# Patient Record
Sex: MALE | Race: WHITE | NOT HISPANIC OR LATINO | Employment: UNEMPLOYED | URBAN - METROPOLITAN AREA
[De-identification: names, ages, dates, MRNs, and addresses within clinical notes are randomized per-mention and may not be internally consistent; named-entity substitution may affect disease eponyms.]

---

## 2022-09-03 ENCOUNTER — HOSPITAL ENCOUNTER (EMERGENCY)
Facility: HOSPITAL | Age: 62
Discharge: HOME | End: 2022-09-04
Attending: EMERGENCY MEDICINE

## 2022-09-03 DIAGNOSIS — R45.850 HOMICIDAL IDEATION: ICD-10-CM

## 2022-09-03 DIAGNOSIS — R45.851 SUICIDAL IDEATION: Primary | ICD-10-CM

## 2022-09-03 DIAGNOSIS — R44.3 HALLUCINATIONS: ICD-10-CM

## 2022-09-03 LAB
ALBUMIN SERPL BCP-MCNC: 3.1 G/DL (ref 3.5–5.2)
ALP SERPL-CCNC: 110 U/L (ref 55–135)
ALT SERPL W/O P-5'-P-CCNC: 11 U/L (ref 10–44)
AMPHET+METHAMPHET UR QL: NEGATIVE
ANION GAP SERPL CALC-SCNC: 8 MMOL/L (ref 8–16)
APAP SERPL-MCNC: <3 UG/ML (ref 10–20)
AST SERPL-CCNC: 14 U/L (ref 10–40)
BARBITURATES UR QL SCN>200 NG/ML: NEGATIVE
BASOPHILS # BLD AUTO: 0.03 K/UL (ref 0–0.2)
BASOPHILS NFR BLD: 0.5 % (ref 0–1.9)
BENZODIAZ UR QL SCN>200 NG/ML: NEGATIVE
BILIRUB SERPL-MCNC: 0.3 MG/DL (ref 0.1–1)
BILIRUB UR QL STRIP: NEGATIVE
BUN SERPL-MCNC: 4 MG/DL (ref 8–23)
BZE UR QL SCN: NEGATIVE
CALCIUM SERPL-MCNC: 8.8 MG/DL (ref 8.7–10.5)
CANNABINOIDS UR QL SCN: ABNORMAL
CHLORIDE SERPL-SCNC: 103 MMOL/L (ref 95–110)
CLARITY UR REFRACT.AUTO: CLEAR
CO2 SERPL-SCNC: 22 MMOL/L (ref 23–29)
COLOR UR AUTO: YELLOW
CREAT SERPL-MCNC: 0.7 MG/DL (ref 0.5–1.4)
CREAT UR-MCNC: 71 MG/DL (ref 23–375)
DIFFERENTIAL METHOD: ABNORMAL
EOSINOPHIL # BLD AUTO: 0.1 K/UL (ref 0–0.5)
EOSINOPHIL NFR BLD: 2.5 % (ref 0–8)
ERYTHROCYTE [DISTWIDTH] IN BLOOD BY AUTOMATED COUNT: 11.5 % (ref 11.5–14.5)
EST. GFR  (NO RACE VARIABLE): >60 ML/MIN/1.73 M^2
ETHANOL SERPL-MCNC: 110 MG/DL
ETHANOL SERPL-MCNC: 214 MG/DL
ETHANOL SERPL-MCNC: 76 MG/DL
GLUCOSE SERPL-MCNC: 83 MG/DL (ref 70–110)
GLUCOSE UR QL STRIP: NEGATIVE
HCT VFR BLD AUTO: 31.2 % (ref 40–54)
HGB BLD-MCNC: 11.4 G/DL (ref 14–18)
HGB UR QL STRIP: NEGATIVE
IMM GRANULOCYTES # BLD AUTO: 0.01 K/UL (ref 0–0.04)
IMM GRANULOCYTES NFR BLD AUTO: 0.2 % (ref 0–0.5)
KETONES UR QL STRIP: NEGATIVE
LEUKOCYTE ESTERASE UR QL STRIP: NEGATIVE
LYMPHOCYTES # BLD AUTO: 1.4 K/UL (ref 1–4.8)
LYMPHOCYTES NFR BLD: 25 % (ref 18–48)
MCH RBC QN AUTO: 33.5 PG (ref 27–31)
MCHC RBC AUTO-ENTMCNC: 36.5 G/DL (ref 32–36)
MCV RBC AUTO: 92 FL (ref 82–98)
METHADONE UR QL SCN>300 NG/ML: NEGATIVE
MONOCYTES # BLD AUTO: 0.5 K/UL (ref 0.3–1)
MONOCYTES NFR BLD: 9.6 % (ref 4–15)
NEUTROPHILS # BLD AUTO: 3.5 K/UL (ref 1.8–7.7)
NEUTROPHILS NFR BLD: 62.2 % (ref 38–73)
NITRITE UR QL STRIP: NEGATIVE
NRBC BLD-RTO: 0 /100 WBC
OPIATES UR QL SCN: NEGATIVE
PCP UR QL SCN>25 NG/ML: NEGATIVE
PH UR STRIP: 7 [PH] (ref 5–8)
PLATELET # BLD AUTO: 222 K/UL (ref 150–450)
PMV BLD AUTO: 8.5 FL (ref 9.2–12.9)
POTASSIUM SERPL-SCNC: 3.5 MMOL/L (ref 3.5–5.1)
PROT SERPL-MCNC: 6.8 G/DL (ref 6–8.4)
PROT UR QL STRIP: NEGATIVE
RBC # BLD AUTO: 3.4 M/UL (ref 4.6–6.2)
SARS-COV-2 RDRP RESP QL NAA+PROBE: NEGATIVE
SODIUM SERPL-SCNC: 133 MMOL/L (ref 136–145)
SP GR UR STRIP: 1.01 (ref 1–1.03)
TOXICOLOGY INFORMATION: ABNORMAL
TSH SERPL DL<=0.005 MIU/L-ACNC: 2.47 UIU/ML (ref 0.4–4)
URN SPEC COLLECT METH UR: NORMAL
WBC # BLD AUTO: 5.65 K/UL (ref 3.9–12.7)

## 2022-09-03 PROCEDURE — 99284 PR EMERGENCY DEPT VISIT,LEVEL IV: ICD-10-PCS | Mod: CS,,, | Performed by: EMERGENCY MEDICINE

## 2022-09-03 PROCEDURE — U0002 COVID-19 LAB TEST NON-CDC: HCPCS

## 2022-09-03 PROCEDURE — 82077 ASSAY SPEC XCP UR&BREATH IA: CPT

## 2022-09-03 PROCEDURE — 99284 EMERGENCY DEPT VISIT MOD MDM: CPT | Mod: CS,,, | Performed by: EMERGENCY MEDICINE

## 2022-09-03 PROCEDURE — 82077 ASSAY SPEC XCP UR&BREATH IA: CPT | Mod: 91

## 2022-09-03 PROCEDURE — 84443 ASSAY THYROID STIM HORMONE: CPT

## 2022-09-03 PROCEDURE — 80143 DRUG ASSAY ACETAMINOPHEN: CPT

## 2022-09-03 PROCEDURE — 80307 DRUG TEST PRSMV CHEM ANLYZR: CPT

## 2022-09-03 PROCEDURE — 99285 EMERGENCY DEPT VISIT HI MDM: CPT

## 2022-09-03 PROCEDURE — 81003 URINALYSIS AUTO W/O SCOPE: CPT | Mod: 59

## 2022-09-03 PROCEDURE — 80053 COMPREHEN METABOLIC PANEL: CPT

## 2022-09-03 PROCEDURE — 85025 COMPLETE CBC W/AUTO DIFF WBC: CPT

## 2022-09-03 NOTE — PROVIDER PROGRESS NOTES - EMERGENCY DEPT.
Encounter Date: 9/3/2022    ED Physician Progress Notes        Physician Note:   --------------------            09/03/22               1614     --------------------   BP:       123/63     Pulse:      67       Resp:       16       Temp:               --------------------    Pt signed out from Dr Goncalves, pending UDS and UA.  I saw and examined the patient.  Results returned and patient was considered medically cleared for transfer to psychiatric facility.

## 2022-09-03 NOTE — ED NOTES
Patient identifiers verified and correct for Bjorn Handy  LOC: The patient is awake, alert and aware of environment the patient is oriented x 3 and speaking appropriately.   APPEARANCE: Patient appears comfortable and in no acute distress, patient is unkept.  SKIN: The skin is warm and dry, color consistent with ethnicity, patient has normal skin turgor and moist mucus membranes, skin intact, no breakdown or bruising noted.   MUSCULOSKELETAL: Patient moving all extremities spontaneously, no swelling noted.  RESPIRATORY: Airway is open and patent, respirations are spontaneous, patient has a normal effort and rate, no accessory muscle use noted, pt placed on continuous pulse ox with O2 sats noted at 99% on room air.  CARDIAC: Patient has a normal rate, no edema noted, capillary refill < 3 seconds.   GASTRO: Soft and non tender to palpation, no distention noted, normoactive bowel sounds present in all four quadrants. Pt states bowel movements have been regular.  : Pt denies any pain or frequency with urination.  NEURO: Pt opens eyes spontaneously, behavior appropriate to situation, follows commands, facial expression symmetrical, bilateral hand grasp equal and even, purposeful motor response noted, normal sensation in all extremities when touched with a finger.

## 2022-09-03 NOTE — ED PROVIDER NOTES
"Encounter Date: 9/3/2022       History     Chief Complaint   Patient presents with    Depression     Hx of depression and bipolar; states meds are not working.     Suicidal     62-year-old male with a past medical history of depression and bipolar disorder presents the ED complaining of his medications not working.  Patient states that he is from [place] and he presents the ED today because he has had new onset suicidal ideation as of today.  He reports a suicidal plan - "I would buy a gun and shoot myself".  He also reports hospital ideation and plan of homicide of his previous roommate for "cheating him".  He reports both auditory and visual hallucinations.  Patient denies all other complaints at this time.  No other complaints this time.    The history is provided by the patient.   Review of patient's allergies indicates:  No Known Allergies  No past medical history on file.  No past surgical history on file.  No family history on file.     Review of Systems   Constitutional:  Negative for activity change, chills and fever.   HENT:  Negative for congestion, ear pain and sore throat.    Respiratory:  Negative for shortness of breath and stridor.    Cardiovascular:  Negative for chest pain and palpitations.   Gastrointestinal:  Negative for abdominal pain, nausea and vomiting.   Genitourinary:  Negative for dysuria and urgency.   Musculoskeletal:  Negative for back pain.   Skin:  Negative for rash.   Allergic/Immunologic: Negative for environmental allergies, food allergies and immunocompromised state.   Neurological:  Negative for dizziness, syncope, weakness and headaches.   Hematological:  Does not bruise/bleed easily.   Psychiatric/Behavioral:  Positive for suicidal ideas.         Suicidal ideas, homicidal ideas, visual and auditory hallucinations.     Physical Exam     Initial Vitals [09/03/22 1203]   BP Pulse Resp Temp SpO2   (!) 162/74 84 18 98.2 °F (36.8 °C) 99 %      MAP       --         Physical " Exam    Nursing note and vitals reviewed.  Constitutional: Vital signs are normal. He appears well-developed and well-nourished. He is not diaphoretic. He appears distressed.   Appears to be in mild-to-moderate distress due to symptoms.   HENT:   Head: Normocephalic and atraumatic.   Right Ear: External ear normal.   Left Ear: External ear normal.   Eyes: EOM are normal. Right eye exhibits no discharge. Left eye exhibits no discharge.   Neck: Trachea normal. Neck supple. No thyroid mass present.   Cardiovascular:  Normal rate, regular rhythm, normal heart sounds and intact distal pulses.     Exam reveals no gallop and no friction rub.       No murmur heard.  Pulmonary/Chest: Breath sounds normal. No respiratory distress. He has no wheezes. He has no rhonchi. He has no rales.   Abdominal: Abdomen is soft. Bowel sounds are normal. He exhibits no distension. There is no abdominal tenderness. There is no rebound and no guarding.   Musculoskeletal:      Cervical back: Neck supple.     Neurological: He is alert and oriented to person, place, and time. He has normal strength. No cranial nerve deficit or sensory deficit. GCS score is 15. GCS eye subscore is 4. GCS verbal subscore is 5. GCS motor subscore is 6.   Skin: Skin is warm and dry. Capillary refill takes less than 2 seconds. No rash noted.   Psychiatric: He exhibits a depressed mood. He expresses homicidal and suicidal ideation. He expresses suicidal plans and homicidal plans.       ED Course   Procedures  Labs Reviewed   CBC W/ AUTO DIFFERENTIAL - Abnormal; Notable for the following components:       Result Value    RBC 3.40 (*)     Hemoglobin 11.4 (*)     Hematocrit 31.2 (*)     MCH 33.5 (*)     MCHC 36.5 (*)     MPV 8.5 (*)     All other components within normal limits   COMPREHENSIVE METABOLIC PANEL - Abnormal; Notable for the following components:    Sodium 133 (*)     CO2 22 (*)     BUN 4 (*)     Albumin 3.1 (*)     All other components within normal limits    ALCOHOL,MEDICAL (ETHANOL) - Abnormal; Notable for the following components:    Alcohol, Serum 214 (*)     All other components within normal limits   ACETAMINOPHEN LEVEL - Abnormal; Notable for the following components:    Acetaminophen (Tylenol), Serum <3.0 (*)     All other components within normal limits   TSH   URINALYSIS, REFLEX TO URINE CULTURE    Narrative:     Specimen Source->Urine   SARS-COV-2 RNA AMPLIFICATION, QUAL   DRUG SCREEN PANEL, URINE EMERGENCY          Imaging Results    None          Medications - No data to display  Medical Decision Making:   Initial Assessment:   62-year-old male who appears to be in mild to moderate distress due to symptoms presents to the ED reporting SI, HI and visual/auditory hallucinations.  ABC's intact.  Physical exam is grossly unremarkable.  Differential Diagnosis:   MDD  Bipolar disorder  Schizophrenia  Electrolyte abnormality  Medication noncompliance    ED Management:  CBC shows a mild anemia which is in no need of emergent intervention.  CMP, TSH, COVID screening, acetaminophen level are all unremarkable.  Ethanol is elevated at 214.  Urinalysis is unremarkable.  Patient is active pending drug screen panel, I will sign out the patient to the oncoming provider.           Attending Attestation:   Physician Attestation Statement for Resident:  As the supervising MD   Physician Attestation Statement: I have personally seen and examined this patient.   I agree with the above history.  -:   As the supervising MD I agree with the above PE.     As the supervising MD I agree with the above treatment, course, plan, and disposition.                           Clinical Impression:   Final diagnoses:  [R45.851] Suicidal ideation (Primary)  [R45.850] Homicidal ideation  [R44.3] Hallucinations      ED Disposition Condition    Transfer to Psych Facility           ED Prescriptions    None       Follow-up Information    None          Lucas Goncalves MD  Resident  09/03/22  1531       Marko Woody MD  09/04/22 0702

## 2022-09-03 NOTE — ED NOTES
Pt belongings put in safe with list of items on bag(#[id number])   Pt belongings in closet: 1 pair of jeans, 1 pair of shoes, 1 pair of socks, 1 necklace, 1 cane, 1 pair of underwear, 1 hat, 1 belt, 1 watch

## 2022-09-04 ENCOUNTER — HOSPITAL ENCOUNTER (INPATIENT)
Facility: HOSPITAL | Age: 62
LOS: 9 days | Discharge: HOME | DRG: 885 | End: 2022-09-13
Attending: PSYCHIATRY & NEUROLOGY | Admitting: PSYCHIATRY & NEUROLOGY

## 2022-09-04 VITALS
DIASTOLIC BLOOD PRESSURE: 82 MMHG | SYSTOLIC BLOOD PRESSURE: 152 MMHG | HEART RATE: 92 BPM | OXYGEN SATURATION: 97 % | RESPIRATION RATE: 18 BRPM | TEMPERATURE: 98 F

## 2022-09-04 DIAGNOSIS — F32.9 MDD (MAJOR DEPRESSIVE DISORDER): ICD-10-CM

## 2022-09-04 PROCEDURE — 25000003 PHARM REV CODE 250: Performed by: PSYCHIATRY & NEUROLOGY

## 2022-09-04 PROCEDURE — 11400000 HC PSYCH PRIVATE ROOM

## 2022-09-04 RX ORDER — ADHESIVE BANDAGE
30 BANDAGE TOPICAL DAILY PRN
Status: DISCONTINUED | OUTPATIENT
Start: 2022-09-04 | End: 2022-09-13 | Stop reason: HOSPADM

## 2022-09-04 RX ORDER — HALOPERIDOL 5 MG/ML
10 INJECTION INTRAMUSCULAR EVERY 4 HOURS PRN
Status: DISCONTINUED | OUTPATIENT
Start: 2022-09-04 | End: 2022-09-13 | Stop reason: HOSPADM

## 2022-09-04 RX ORDER — MAG HYDROX/ALUMINUM HYD/SIMETH 200-200-20
30 SUSPENSION, ORAL (FINAL DOSE FORM) ORAL EVERY 6 HOURS PRN
Status: DISCONTINUED | OUTPATIENT
Start: 2022-09-04 | End: 2022-09-13 | Stop reason: HOSPADM

## 2022-09-04 RX ORDER — DIPHENHYDRAMINE HYDROCHLORIDE 50 MG/ML
50 INJECTION INTRAMUSCULAR; INTRAVENOUS EVERY 4 HOURS PRN
Status: DISCONTINUED | OUTPATIENT
Start: 2022-09-04 | End: 2022-09-13 | Stop reason: HOSPADM

## 2022-09-04 RX ORDER — MAG HYDROX/ALUMINUM HYD/SIMETH 200-200-20
30 SUSPENSION, ORAL (FINAL DOSE FORM) ORAL
Status: DISCONTINUED | OUTPATIENT
Start: 2022-09-04 | End: 2022-09-13 | Stop reason: HOSPADM

## 2022-09-04 RX ORDER — LEVOTHYROXINE SODIUM 50 UG/1
50 TABLET ORAL
Status: DISCONTINUED | OUTPATIENT
Start: 2022-09-05 | End: 2022-09-13 | Stop reason: HOSPADM

## 2022-09-04 RX ORDER — GABAPENTIN 100 MG/1
100 CAPSULE ORAL 3 TIMES DAILY
Status: COMPLETED | OUTPATIENT
Start: 2022-09-08 | End: 2022-09-09

## 2022-09-04 RX ORDER — LORAZEPAM 2 MG/ML
2 INJECTION INTRAMUSCULAR EVERY 4 HOURS PRN
Status: DISCONTINUED | OUTPATIENT
Start: 2022-09-04 | End: 2022-09-13 | Stop reason: HOSPADM

## 2022-09-04 RX ORDER — FOLIC ACID 1 MG/1
1 TABLET ORAL DAILY
Status: DISCONTINUED | OUTPATIENT
Start: 2022-09-04 | End: 2022-09-13 | Stop reason: HOSPADM

## 2022-09-04 RX ORDER — SERTRALINE HYDROCHLORIDE 50 MG/1
50 TABLET, FILM COATED ORAL DAILY
Status: DISCONTINUED | OUTPATIENT
Start: 2022-09-04 | End: 2022-09-13 | Stop reason: HOSPADM

## 2022-09-04 RX ORDER — DIPHENHYDRAMINE HCL 50 MG
50 CAPSULE ORAL EVERY 4 HOURS PRN
Status: DISCONTINUED | OUTPATIENT
Start: 2022-09-04 | End: 2022-09-13 | Stop reason: HOSPADM

## 2022-09-04 RX ORDER — GABAPENTIN 300 MG/1
300 CAPSULE ORAL 3 TIMES DAILY
Status: DISPENSED | OUTPATIENT
Start: 2022-09-04 | End: 2022-09-06

## 2022-09-04 RX ORDER — GABAPENTIN 100 MG/1
200 CAPSULE ORAL 3 TIMES DAILY
Status: DISPENSED | OUTPATIENT
Start: 2022-09-06 | End: 2022-09-08

## 2022-09-04 RX ORDER — TRAZODONE HYDROCHLORIDE 100 MG/1
100 TABLET ORAL NIGHTLY PRN
Status: DISCONTINUED | OUTPATIENT
Start: 2022-09-04 | End: 2022-09-06

## 2022-09-04 RX ORDER — THIAMINE HCL 100 MG
100 TABLET ORAL DAILY
Status: DISCONTINUED | OUTPATIENT
Start: 2022-09-04 | End: 2022-09-13 | Stop reason: HOSPADM

## 2022-09-04 RX ORDER — HYDROXYZINE HYDROCHLORIDE 50 MG/1
50 TABLET, FILM COATED ORAL EVERY 4 HOURS PRN
Status: DISCONTINUED | OUTPATIENT
Start: 2022-09-04 | End: 2022-09-13 | Stop reason: HOSPADM

## 2022-09-04 RX ORDER — ACETAMINOPHEN 325 MG/1
650 TABLET ORAL EVERY 6 HOURS PRN
Status: DISCONTINUED | OUTPATIENT
Start: 2022-09-04 | End: 2022-09-13 | Stop reason: HOSPADM

## 2022-09-04 RX ORDER — LORAZEPAM 1 MG/1
2 TABLET ORAL EVERY 4 HOURS PRN
Status: DISCONTINUED | OUTPATIENT
Start: 2022-09-04 | End: 2022-09-13 | Stop reason: HOSPADM

## 2022-09-04 RX ORDER — HALOPERIDOL 5 MG/1
10 TABLET ORAL EVERY 4 HOURS PRN
Status: DISCONTINUED | OUTPATIENT
Start: 2022-09-04 | End: 2022-09-13 | Stop reason: HOSPADM

## 2022-09-04 RX ADMIN — FOLIC ACID 1 MG: 1 TABLET ORAL at 03:09

## 2022-09-04 RX ADMIN — THIAMINE HCL TAB 100 MG 100 MG: 100 TAB at 03:09

## 2022-09-04 RX ADMIN — ACETAMINOPHEN 650 MG: 325 TABLET ORAL at 06:09

## 2022-09-04 RX ADMIN — SERTRALINE HYDROCHLORIDE 50 MG: 50 TABLET ORAL at 03:09

## 2022-09-04 RX ADMIN — TRAZODONE HYDROCHLORIDE 100 MG: 100 TABLET ORAL at 08:09

## 2022-09-04 RX ADMIN — GABAPENTIN 300 MG: 300 CAPSULE ORAL at 03:09

## 2022-09-04 RX ADMIN — GABAPENTIN 300 MG: 300 CAPSULE ORAL at 08:09

## 2022-09-04 RX ADMIN — MULTIPLE VITAMINS W/ MINERALS TAB 1 TABLET: TAB at 03:09

## 2022-09-04 NOTE — ED NOTES
Pt remains in paper scrubs, resting in stretcher comfortably. No signs of distress noted. Sitter remains at bedside in direct visual contact, charting per protocol every 15 minutes. No equipment or belongings are in the patients room. Pt aware of plan of care. Will continue to monitor.

## 2022-09-04 NOTE — ED NOTES
Pt care assumed. Report received by POLO Nickerson. Pt lying in stretcher in low and locked position and side rails raised x2.  Pt in NAD and verbalized no needs at this time.

## 2022-09-04 NOTE — ED NOTES
Pt resting calmly without distress noted. No new needs expressed at this time. RN/tech at bedside. Self repositioning noted. Visible rise and fall of chest. No apparent needs at this time. Will continue to monitor.

## 2022-09-04 NOTE — PLAN OF CARE
"Psychosocial Assessment     Pt is a 62 y.o. YO  male admitted due to Depression. Pt UDS was presumptuous for THC.  PT ETOH 214 in ED. Pt denies  SI, HI, and AVH. Pt admits to smoking marijuana and drinking alcohol prior to admission. Pt last inpatient  was over a month ago in [place]. Pt presents Cooperative with CM staff 1:1. Pt states that he went to the hospital in [place] to get his eyes checked, but was sent here. Pt states that he is homeless, he lost his housing when he went inpatient. Pt states that he is depressed since losing his mother and sisters. Pt reports having a brother and children, but do not talk to them.  Pt originally from [place]. Pt has  3 dependents. Pt  is Single .  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  Pt reports financial issues. Pt states that he only gets about $300 in SS. Pt disabled. Pt denies legal issues. Pt states they do not receive comfort from spiritual practices. Pt did not have a collateral contact. Pt discharge plan at this time is shelter or unknown at this time.       09/04/22 1400   Initial Information   Source of Information patient   Reason for Admission Depression   Patient Aware of Diagnosis yes   Limitations on Visitors/Phone Calls none   Temporary Family Living Arrangements (While Hospitalized) none needed   Arrived From emergency department   Current or Previous  Service active duty, past   Mutuality/Individual Preferences   Anxieties, Fears or Concerns "Life gives me anxiety."   Patient-Specific Goals (Include Timeframe) "Getting my eyes fixed, I am going blind."   Spiritual Beliefs   Spiritual, Cultural Beliefs, Spiritism Practices, Values that Affect Care yes   Description of Beliefs that Will Affect Care prodistin    Contact Status none needed   Hospital  Requested no   Coping/Stress/Tolerance   Major Change/Loss/Stressor/Fears death of a loved one   Relationship/Environment   Primary Source of " Support/Comfort no one   Lives With alone   Resource/Environmental Concerns   Current Living Arrangements homeless   Resource/Environmental Concerns environmental;financial   Substance Use/Withdrawal   Substance Use Current, used prior to admission   Have You Ever Experienced Withdrawal Symptoms? Patient denies   Additional Tobacco Use   How many cigarettes do you typically have per day? 10   Abuse Screen (yes response referral indicated)   Feels Unsafe at Home or Work/School no   Feels Threatened by Someone no   Does Anyone Try to Keep You From Having Contact with Others or Doing Things Outside Your Home? no   Physical Signs of Abuse Present no   Abuse Details   Physical Abuse No   Sexual Abuse No   Emotional Abuse No   Violence Risk   Feels Like Hurting Others no   Previous Attempt to Harm Others no   AUDIT-C (Alcohol Use Disorders ID Test)   Alcohol Use In Past Year 3-->two to three times per week   Alcohol Amount Per Day In Past Year 2-->five or six   More Than 6 Drinks On One Occasion 4-->daily or almost daily   Total AUDIT-C Score 9   Depression Screen (Over the Past Two Weeks)   Have You Felt Down, Depressed or Hopeless? yes   Have You Felt Little Interest or Pleasure in Doing Things? yes   Geriatric Depression Scale: Short Form   Are You Basically Satisfied with Your Life? 1-->no   Have You Dropped Many of Your Activities and Interests? 1-->yes   Do You Feel That Your Life Is Empty? 1-->yes   Do You Often Get Bored? 1-->yes   Are You in Good Spirits Most of the Time? 1-->no   Are You Afraid That Something Bad Is Going to Happen to You? 0-->no   Do You Feel Happy Most of the Time? 1-->no   Do You Often Feel Helpless? 1-->yes   Do You Prefer to Stay at Home, Rather Than Going Out and Doing New Things? 1-->yes   Do You Feel You Have More Problems with Memory Than Most? 1-->yes   Do You Think It Is Wonderful to be Alive Now? 1-->no   Do You Feel Pretty Worthless the Way You Are Now? 1-->yes   Do You Feel Full of  Energy? 1-->no   Do You Feel That Your Situation Is Hopeless? 1-->yes   Do You Feel That Most People Are Better Off Than You Are? 1-->yes   Score: Calculated (Geriatric Depression Scale) 14   Transition Planning   Patient/Family Anticipates Transition to shelter   Patient/Family Anticipated Services at Transition outpatient care   Transportation Anticipated health plan transportation

## 2022-09-04 NOTE — PSYCH EVALUATION
9/4/2022 11:00 AM   Bjorn Handy   1960   [id number]            Psychiatry Inpatient Admission Note    Date of Admission: 9/4/2022  8:00 AM    Current Legal Status: Physician's Emergency Certificate (PEC)    Chief Complaint: Depression with suicidal ideation    SUBJECTIVE:   History of Present Illness:   Bjorn Handy is a 62 y.o. male placed under a PEC at [facility] on [facility] due to reports that he felt like his depression medication was ineffective and he had been having suicidal ideation.  He reported a potential plan of buying a gun and shooting himself.  [facility] [place] [facility] [place] [facility] [place] because he needs eye surgery.  He was most recently in [place] 1 month ago.  Calm and cooperative during my exam.  He does not feel as though his current medication regimen has been sufficient for depression.  Admitted for medication management and safety monitoring.    UDS: presumptive cannabis  Blood alcohol: 214 in ED      Past Psychiatric History:   Previous Psychiatric Hospitalizations: Several prior inpatient hospitalizations   Previous Suicide Attempts: No actual attempts noted   [facility]    Past Medical/Surgical History:   Past Medical History:   Diagnosis Date    Addiction to drug     Alcohol abuse     Anxiety     Bipolar disorder     Depression     Hallucination     Headache     History of psychiatric hospitalization     Hx of psychiatric care     Hypertension     Liver disease     Neuropathy     Psychiatric exam requested by authority     Psychiatric problem     Suicide attempt     Withdrawal symptoms, alcohol      No past surgical history on file.      Family Psychiatric History:   None known     Allergies:   Review of patient's allergies indicates:   Allergen Reactions    Opioids - morphine analogues Anaphylaxis    Penicillins Anaphylaxis       Substance Abuse History:   Tobacco: 1/2 ppd  Alcohol: States  "that he rarely drinks  Illicit Substances: "Every now and then"  Treatment: Denies      Current Medications:   Home Psychiatric Meds: Trazodone, Depakote, Vistaril, Melatonin    Scheduled Meds:    aluminum-magnesium hydroxide-simethicone  30 mL Oral QID (AC & HS)      PRN Meds: acetaminophen, aluminum-magnesium hydroxide-simethicone, haloperidoL **AND** diphenhydrAMINE **AND** LORazepam, diphenhydrAMINE, haloperidol lactate, hydrOXYzine HCL, lorazepam, magnesium hydroxide 400 mg/5 ml, magnesium hydroxide 400 mg/5 ml, trazodone   Psychotherapeutics (From admission, onward)      Start     Stop Route Frequency Ordered    09/04/22 0800  haloperidol lactate injection 10 mg         -- IM Every 4 hours PRN 09/04/22 0800    09/04/22 0800  lorazepam injection 2 mg         -- IM Every 4 hours PRN 09/04/22 0800    09/04/22 0800  traZODone tablet 100 mg         -- Oral Nightly PRN 09/04/22 0800    09/04/22 0800  haloperidoL tablet 10 mg  (Med - Acute  Behavioral Management)        See Hyperspace for full Linked Orders Report.    -- Oral Every 4 hours PRN 09/04/22 0800    09/04/22 0800  LORazepam tablet 2 mg  (Med - Acute  Behavioral Management)        See Hyperspace for full Linked Orders Report.    -- Oral Every 4 hours PRN 09/04/22 0800              Social History:  Housing Status: Lives alone in a [facility] in [place]  Relationship Status/Sexual Orientation: Never .  No current relationship  Children: 3 children  Education: 8th grade education   Employment Status/Info: Unemployed    history: [employer]        Legal History:   Past Charges/Incarcerations: 6 year incarceration roughly 20 years ago   Pending charges: Denies      OBJECTIVE:   Medical Review Of Systems:  Constitutional: negative  Eyes: right eye blindness.  Left eye cataracts.  Respiratory: negative  Cardiovascular: negative  Gastrointestinal: negative  Genitourinary:negative  Musculoskeletal:negative  Neurological: negative    Vitals   Vitals:    " 09/04/22 0710   BP: (!) 152/87   Pulse: 109   Resp: 18   Temp: 97.9 °F (36.6 °C)        Labs/Imaging/Studies:   Recent Results (from the past 48 hour(s))   CBC auto differential    Collection Time: 09/03/22 12:43 PM   Result Value Ref Range    WBC 5.65 3.90 - 12.70 K/uL    RBC 3.40 (L) 4.60 - 6.20 M/uL    Hemoglobin 11.4 (L) 14.0 - 18.0 g/dL    Hematocrit 31.2 (L) 40.0 - 54.0 %    MCV 92 82 - 98 fL    MCH 33.5 (H) 27.0 - 31.0 pg    MCHC 36.5 (H) 32.0 - 36.0 g/dL    RDW 11.5 11.5 - 14.5 %    Platelets 222 150 - 450 K/uL    MPV 8.5 (L) 9.2 - 12.9 fL    Immature Granulocytes 0.2 0.0 - 0.5 %    Gran # (ANC) 3.5 1.8 - 7.7 K/uL    Immature Grans (Abs) 0.01 0.00 - 0.04 K/uL    Lymph # 1.4 1.0 - 4.8 K/uL    Mono # 0.5 0.3 - 1.0 K/uL    Eos # 0.1 0.0 - 0.5 K/uL    Baso # 0.03 0.00 - 0.20 K/uL    nRBC 0 0 /100 WBC    Gran % 62.2 38.0 - 73.0 %    Lymph % 25.0 18.0 - 48.0 %    Mono % 9.6 4.0 - 15.0 %    Eosinophil % 2.5 0.0 - 8.0 %    Basophil % 0.5 0.0 - 1.9 %    Differential Method Automated    Comprehensive metabolic panel    Collection Time: 09/03/22 12:43 PM   Result Value Ref Range    Sodium 133 (L) 136 - 145 mmol/L    Potassium 3.5 3.5 - 5.1 mmol/L    Chloride 103 95 - 110 mmol/L    CO2 22 (L) 23 - 29 mmol/L    Glucose 83 70 - 110 mg/dL    BUN 4 (L) 8 - 23 mg/dL    Creatinine 0.7 0.5 - 1.4 mg/dL    Calcium 8.8 8.7 - 10.5 mg/dL    Total Protein 6.8 6.0 - 8.4 g/dL    Albumin 3.1 (L) 3.5 - 5.2 g/dL    Total Bilirubin 0.3 0.1 - 1.0 mg/dL    Alkaline Phosphatase 110 55 - 135 U/L    AST 14 10 - 40 U/L    ALT 11 10 - 44 U/L    Anion Gap 8 8 - 16 mmol/L    eGFR >60.0 >60 mL/min/1.73 m^2   TSH    Collection Time: 09/03/22 12:43 PM   Result Value Ref Range    TSH 2.472 0.400 - 4.000 uIU/mL   Ethanol    Collection Time: 09/03/22 12:43 PM   Result Value Ref Range    Alcohol, Serum 214 (H) <10 mg/dL   Acetaminophen level    Collection Time: 09/03/22 12:43 PM   Result Value Ref Range    Acetaminophen (Tylenol), Serum <3.0 (L) 10.0 -  20.0 ug/mL   Urinalysis, Reflex to Urine Culture Urine, Clean Catch    Collection Time: 09/03/22 12:45 PM    Specimen: Urine   Result Value Ref Range    Specimen UA Urine, Clean Catch     Color, UA Yellow Yellow, Straw, Millicent    Appearance, UA Clear Clear    pH, UA 7.0 5.0 - 8.0    Specific Gravity, UA 1.010 1.005 - 1.030    Protein, UA Negative Negative    Glucose, UA Negative Negative    Ketones, UA Negative Negative    Bilirubin (UA) Negative Negative    Occult Blood UA Negative Negative    Nitrite, UA Negative Negative    Leukocytes, UA Negative Negative   Drug screen panel, emergency    Collection Time: 09/03/22 12:45 PM   Result Value Ref Range    Benzodiazepines Negative Negative    Methadone metabolites Negative Negative    Cocaine (Metab.) Negative Negative    Opiate Scrn, Ur Negative Negative    Barbiturate Screen, Ur Negative Negative    Amphetamine Screen, Ur Negative Negative    THC Presumptive Positive (A) Negative    Phencyclidine Negative Negative    Creatinine, Urine 71.0 23.0 - 375.0 mg/dL    Toxicology Information SEE COMMENT    COVID-19 Rapid Screening    Collection Time: 09/03/22 12:45 PM   Result Value Ref Range    SARS-CoV-2 RNA, Amplification, Qual Negative Negative   Ethanol    Collection Time: 09/03/22  6:41 PM   Result Value Ref Range    Alcohol, Serum 110 (H) <10 mg/dL   Ethanol    Collection Time: 09/03/22  8:07 PM   Result Value Ref Range    Alcohol, Serum 76 (H) <10 mg/dL      No results found for: PHENYTOIN, PHENOBARB, VALPROATE, CBMZ        Psychiatric Mental Status Exam:  General Appearance: appears stated age, well-developed, well-nourished  Arousal: alert  Behavior: cooperative  Movements and Motor Activity: no abnormal involuntary movements noted  Orientation: oriented to person, place, time, and situation  Speech: normal rate, normal rhythm, normal volume, normal tone  Mood: Depressed  Affect: mood-congruent, constricted  Thought Process: linear  Associations: intact  Thought  Content and Perceptions: (+)suicidal ideation, no homicidal ideation, no auditory hallucinations, no visual hallucinations, no paranoid ideation, no ideas of reference, no evidence of delusions or psychosis  Recent and Remote Memory: recent memory intact, remote memory intact  Attention and Concentration: intact, attentive to conversation  Fund of Knowledge: intact, aware of current events, vocabulary appropriate  Insight: intact  Judgment: intact      Patient Strengths:  Access to care and Able to verbalize needs      Patient Liabilities:  Substance use and Depression      Discharge Criteria:  Improved mood and Improved coping skills    ASSESSMENT/PLAN:   Diagnosis:  Major Depressive Disorder, recurrent, severe (F33.2)  Insomnia (F51.01)  Alcohol use disorder, moderate (F10.20)  --R/o withdrawal    Past Medical History:   Diagnosis Date    Addiction to drug     Alcohol abuse     Anxiety     Bipolar disorder     Depression     Hallucination     Headache     History of psychiatric hospitalization     Hx of psychiatric care     Hypertension     Liver disease     Neuropathy     Psychiatric exam requested by authority     Psychiatric problem     Suicide attempt     Withdrawal symptoms, alcohol           Plan:  -Admit to [facility]  -Zoloft 50mg daily  -Gabapentin taper for suspected withdrawal  -Will attempt to obtain outside psychiatric records if available  - to assist with aftercare planning and collateral  -Once stable discharge home with outpatient follow up care and/or rehab  -Continue inpatient treatment under a PEC and/or CEC for danger to self/ danger to others/grave disability as evidenced by danger to self and suspected withdrawal      Estimated length of stay: 5-7 days     Estimated Disposition:  back [facility]    [facility]      On this date, I have reviewed the medical history and Nursing Assessment, as well as records from referral source.  I have evaluated the mental status of the above named  person and concur with the findings of all assessments.  I have provided medical direction for the development of the Treatment Plan.    I conclude that this patient meets admission criteria for inpatient treatment.  I certify that this patient poses a danger to self or others, or would otherwise be considered gravely disabled based on this assessment and/or provided collateral information.     I have provided medical direction for the development of the Treatment plan.  These services will be provided while this patient is under my care and will be based on an individualized plan of care.  The patient can demonstrate a reasonable expectation of improvement in his/her disorder as a result of the active treatment being provided.      Dawit Doll M.D.

## 2022-09-04 NOTE — H&P
[facility]  History & Physical    Subjective:      Chief Complaint/Reason for Admission: richard Handy is a 62 y.o. male. He was admitted by PEC at [facility] on [facility] due to reports that he felt like his depression medication was ineffective and he had been having suicidal ideation.  He reported a potential plan of buying a gun and shooting himself.    He reports he has a history of hypothyroidism and take 5mcg synthroid daily, also takes gabapentin daily for neuropathy.     There is no problem list on file for this patient.     Past Medical History:   Diagnosis Date    Addiction to drug     Alcohol abuse     Anxiety     Bipolar disorder     Depression     Hallucination     Headache     History of psychiatric hospitalization     Hx of psychiatric care     Hypertension     Liver disease     Neuropathy     Psychiatric exam requested by authority     Psychiatric problem     Suicide attempt     Withdrawal symptoms, alcohol      No past surgical history on file.  Family History   Family history unknown: Yes     Social History     Tobacco Use    Smoking status: Every Day     Packs/day: 1.50     Years: 15.00     Pack years: 22.50     Types: Cigarettes    Smokeless tobacco: Never   Substance Use Topics    Alcohol use: Yes     Alcohol/week: 7.0 standard drinks     Types: 7 Shots of liquor per week    Drug use: Yes     Types: Marijuana       No medications prior to admission.     Review of patient's allergies indicates:   Allergen Reactions    Opioids - morphine analogues Anaphylaxis    Penicillins Anaphylaxis        Review of Systems   Constitutional: Negative.    HENT: Negative.     Eyes: Negative.    Respiratory: Negative.     Cardiovascular: Negative.    Gastrointestinal: Negative.    Genitourinary: Negative.    Musculoskeletal: Negative.    Skin: Negative.    Neurological: Negative.    Psychiatric/Behavioral:  Positive for suicidal ideas.       Objective:      Vital Signs (Most Recent)  Body mass index is 26.4 kg/m².   Temp: 97.9 °F (36.6 °C) (09/04/22 0710)  Pulse: 109 (09/04/22 0710)  Resp: 18 (09/04/22 0710)  BP: (!) 152/87 (09/04/22 0710)  SpO2: 97 % (09/04/22 0710)    Vital Signs Range (Last 24H):  Temp:  [97.5 °F (36.4 °C)-98.3 °F (36.8 °C)]   Pulse:  []   Resp:  [16-18]   BP: (123-174)/(63-93)   SpO2:  [95 %-99 %]     Physical Exam  Vitals reviewed.   Constitutional:       Appearance: Normal appearance.   HENT:      Head: Normocephalic.      Right Ear: External ear normal.      Left Ear: External ear normal.      Nose: No rhinorrhea.      Mouth/Throat:      Mouth: Mucous membranes are moist.      Pharynx: No posterior oropharyngeal erythema.   Eyes:      General: No visual field deficit or scleral icterus.     Conjunctiva/sclera: Conjunctivae normal.      Pupils: Pupils are equal, round, and reactive to light.   Cardiovascular:      Rate and Rhythm: Normal rate and regular rhythm.      Heart sounds: No murmur heard.  Pulmonary:      Effort: Pulmonary effort is normal.      Breath sounds: Normal breath sounds.   Abdominal:      General: There is no distension.      Palpations: Abdomen is soft.      Tenderness: There is no abdominal tenderness.   Musculoskeletal:         General: No deformity.      Right lower leg: No edema.      Left lower leg: No edema.   Lymphadenopathy:      Cervical: No cervical adenopathy.   Skin:     Findings: No rash.   Neurological:      Mental Status: He is alert.      Cranial Nerves: No dysarthria or facial asymmetry.      Motor: No tremor.      Gait: Gait abnormal (slow gait, small steps uses wheelchair for support).      Deep Tendon Reflexes:      Reflex Scores:       Patellar reflexes are 2+ on the right side and 2+ on the left side.     Comments: II- WNL-normal pupillary reflex bilaterally  III- WNL-extraocular muscles intact, no ptosis  YY-PZN-vfxwbbdcwts movements intact  V-WNL-facial sensation intact  bilaterally  VI-WNL-extraocular muscles intact no ptosis  VII- WNL-no facial asymmetry  VIII-WNL-hearing grossly intact bilaterally  IX- WNL-symmetrical elevation of palate, no uvula deviation  X-WNL--symmetrical elevation of palate, no uvula deviation  XI-WNL-normal sternocleidomastoid strength bilaterally  XII- WNL-no tongue deviation, no tongue atrophy           Current Facility-Administered Medications:     acetaminophen tablet 650 mg, 650 mg, Oral, Q6H PRN, Dawit Doll MD    aluminum-magnesium hydroxide-simethicone 200-200-20 mg/5 mL suspension 30 mL, 30 mL, Oral, QID (AC & HS), Dawit Doll MD    aluminum-magnesium hydroxide-simethicone 200-200-20 mg/5 mL suspension 30 mL, 30 mL, Oral, Q6H PRN, Dawit Doll MD    haloperidoL tablet 10 mg, 10 mg, Oral, Q4H PRN **AND** diphenhydrAMINE capsule 50 mg, 50 mg, Oral, Q4H PRN **AND** LORazepam tablet 2 mg, 2 mg, Oral, Q4H PRN, Dawit Doll MD    diphenhydrAMINE injection 50 mg, 50 mg, Intramuscular, Q4H PRN, Dawit Doll MD    thiamine tablet 100 mg, 100 mg, Oral, Daily **AND** folic acid tablet 1 mg, 1 mg, Oral, Daily **AND** multivitamin tablet, 1 tablet, Oral, Daily, Dawit Doll MD    gabapentin capsule 300 mg, 300 mg, Oral, TID **FOLLOWED BY** [START ON 9/6/2022] gabapentin capsule 200 mg, 200 mg, Oral, TID **FOLLOWED BY** [START ON 9/8/2022] gabapentin capsule 100 mg, 100 mg, Oral, TID, Dawit Dlol MD    haloperidol lactate injection 10 mg, 10 mg, Intramuscular, Q4H PRN, Dawit Doll MD    hydrOXYzine tablet 50 mg, 50 mg, Oral, Q4H PRN, Dawit Doll MD    lorazepam injection 2 mg, 2 mg, Intramuscular, Q4H PRN, Dawit Doll MD    magnesium hydroxide 400 mg/5 ml suspension 2,400 mg, 30 mL, Oral, Daily PRN, Dawit Doll MD    magnesium hydroxide 400 mg/5 ml suspension 2,400 mg, 30 mL, Oral, Daily PRN, Dawit Doll MD    sertraline tablet 50 mg, 50 mg, Oral,  Daily, Dawit Doll MD    traZODone tablet 100 mg, 100 mg, Oral, Nightly PRN, Dawit Doll MD     Data Review:    Recent Results (from the past 48 hour(s))   CBC auto differential    Collection Time: 09/03/22 12:43 PM   Result Value Ref Range    WBC 5.65 3.90 - 12.70 K/uL    RBC 3.40 (L) 4.60 - 6.20 M/uL    Hemoglobin 11.4 (L) 14.0 - 18.0 g/dL    Hematocrit 31.2 (L) 40.0 - 54.0 %    MCV 92 82 - 98 fL    MCH 33.5 (H) 27.0 - 31.0 pg    MCHC 36.5 (H) 32.0 - 36.0 g/dL    RDW 11.5 11.5 - 14.5 %    Platelets 222 150 - 450 K/uL    MPV 8.5 (L) 9.2 - 12.9 fL    Immature Granulocytes 0.2 0.0 - 0.5 %    Gran # (ANC) 3.5 1.8 - 7.7 K/uL    Immature Grans (Abs) 0.01 0.00 - 0.04 K/uL    Lymph # 1.4 1.0 - 4.8 K/uL    Mono # 0.5 0.3 - 1.0 K/uL    Eos # 0.1 0.0 - 0.5 K/uL    Baso # 0.03 0.00 - 0.20 K/uL    nRBC 0 0 /100 WBC    Gran % 62.2 38.0 - 73.0 %    Lymph % 25.0 18.0 - 48.0 %    Mono % 9.6 4.0 - 15.0 %    Eosinophil % 2.5 0.0 - 8.0 %    Basophil % 0.5 0.0 - 1.9 %    Differential Method Automated    Comprehensive metabolic panel    Collection Time: 09/03/22 12:43 PM   Result Value Ref Range    Sodium 133 (L) 136 - 145 mmol/L    Potassium 3.5 3.5 - 5.1 mmol/L    Chloride 103 95 - 110 mmol/L    CO2 22 (L) 23 - 29 mmol/L    Glucose 83 70 - 110 mg/dL    BUN 4 (L) 8 - 23 mg/dL    Creatinine 0.7 0.5 - 1.4 mg/dL    Calcium 8.8 8.7 - 10.5 mg/dL    Total Protein 6.8 6.0 - 8.4 g/dL    Albumin 3.1 (L) 3.5 - 5.2 g/dL    Total Bilirubin 0.3 0.1 - 1.0 mg/dL    Alkaline Phosphatase 110 55 - 135 U/L    AST 14 10 - 40 U/L    ALT 11 10 - 44 U/L    Anion Gap 8 8 - 16 mmol/L    eGFR >60.0 >60 mL/min/1.73 m^2   TSH    Collection Time: 09/03/22 12:43 PM   Result Value Ref Range    TSH 2.472 0.400 - 4.000 uIU/mL   Ethanol    Collection Time: 09/03/22 12:43 PM   Result Value Ref Range    Alcohol, Serum 214 (H) <10 mg/dL   Acetaminophen level    Collection Time: 09/03/22 12:43 PM   Result Value Ref Range    Acetaminophen (Tylenol),  Serum <3.0 (L) 10.0 - 20.0 ug/mL   Urinalysis, Reflex to Urine Culture Urine, Clean Catch    Collection Time: 09/03/22 12:45 PM    Specimen: Urine   Result Value Ref Range    Specimen UA Urine, Clean Catch     Color, UA Yellow Yellow, Straw, Millicent    Appearance, UA Clear Clear    pH, UA 7.0 5.0 - 8.0    Specific Gravity, UA 1.010 1.005 - 1.030    Protein, UA Negative Negative    Glucose, UA Negative Negative    Ketones, UA Negative Negative    Bilirubin (UA) Negative Negative    Occult Blood UA Negative Negative    Nitrite, UA Negative Negative    Leukocytes, UA Negative Negative   Drug screen panel, emergency    Collection Time: 09/03/22 12:45 PM   Result Value Ref Range    Benzodiazepines Negative Negative    Methadone metabolites Negative Negative    Cocaine (Metab.) Negative Negative    Opiate Scrn, Ur Negative Negative    Barbiturate Screen, Ur Negative Negative    Amphetamine Screen, Ur Negative Negative    THC Presumptive Positive (A) Negative    Phencyclidine Negative Negative    Creatinine, Urine 71.0 23.0 - 375.0 mg/dL    Toxicology Information SEE COMMENT    COVID-19 Rapid Screening    Collection Time: 09/03/22 12:45 PM   Result Value Ref Range    SARS-CoV-2 RNA, Amplification, Qual Negative Negative   Ethanol    Collection Time: 09/03/22  6:41 PM   Result Value Ref Range    Alcohol, Serum 110 (H) <10 mg/dL   Ethanol    Collection Time: 09/03/22  8:07 PM   Result Value Ref Range    Alcohol, Serum 76 (H) <10 mg/dL        No results found.       Assessment and Plan     Mood disorder per psychiatry  Continue home dose synthroid  Continue home dose gabapentin  Monitor bp as it was a little elevated for last reading

## 2022-09-05 PROCEDURE — 11400000 HC PSYCH PRIVATE ROOM

## 2022-09-05 PROCEDURE — 25000003 PHARM REV CODE 250: Performed by: FAMILY MEDICINE

## 2022-09-05 PROCEDURE — 25000003 PHARM REV CODE 250: Performed by: PSYCHIATRY & NEUROLOGY

## 2022-09-05 RX ADMIN — ACETAMINOPHEN 650 MG: 325 TABLET ORAL at 08:09

## 2022-09-05 RX ADMIN — LEVOTHYROXINE SODIUM 50 MCG: 50 TABLET ORAL at 06:09

## 2022-09-05 RX ADMIN — THIAMINE HCL TAB 100 MG 100 MG: 100 TAB at 08:09

## 2022-09-05 RX ADMIN — GABAPENTIN 300 MG: 300 CAPSULE ORAL at 08:09

## 2022-09-05 RX ADMIN — FOLIC ACID 1 MG: 1 TABLET ORAL at 09:09

## 2022-09-05 RX ADMIN — GABAPENTIN 300 MG: 300 CAPSULE ORAL at 03:09

## 2022-09-05 RX ADMIN — MULTIPLE VITAMINS W/ MINERALS TAB 1 TABLET: TAB at 09:09

## 2022-09-05 RX ADMIN — ALUMINUM HYDROXIDE, MAGNESIUM HYDROXIDE, AND DIMETHICONE 30 ML: 200; 20; 200 SUSPENSION ORAL at 02:09

## 2022-09-05 RX ADMIN — TRAZODONE HYDROCHLORIDE 100 MG: 100 TABLET ORAL at 08:09

## 2022-09-05 RX ADMIN — HYDROXYZINE HYDROCHLORIDE 50 MG: 50 TABLET, FILM COATED ORAL at 08:09

## 2022-09-05 RX ADMIN — ALUMINUM HYDROXIDE, MAGNESIUM HYDROXIDE, AND DIMETHICONE 30 ML: 200; 20; 200 SUSPENSION ORAL at 08:09

## 2022-09-05 RX ADMIN — SERTRALINE HYDROCHLORIDE 50 MG: 50 TABLET ORAL at 08:09

## 2022-09-05 RX ADMIN — ACETAMINOPHEN 650 MG: 325 TABLET ORAL at 04:09

## 2022-09-06 LAB
ALBUMIN SERPL-MCNC: 3.8 GM/DL (ref 3.4–4.8)
ALBUMIN/GLOB SERPL: 1 RATIO (ref 1.1–2)
ALP SERPL-CCNC: 116 UNIT/L (ref 40–150)
ALT SERPL-CCNC: 9 UNIT/L (ref 0–55)
AST SERPL-CCNC: 18 UNIT/L (ref 5–34)
BASOPHILS # BLD AUTO: 0.04 X10(3)/MCL (ref 0–0.2)
BASOPHILS NFR BLD AUTO: 0.6 %
BILIRUBIN DIRECT+TOT PNL SERPL-MCNC: 0.5 MG/DL
BUN SERPL-MCNC: 16.4 MG/DL (ref 8.4–25.7)
CALCIUM SERPL-MCNC: 10 MG/DL (ref 8.8–10)
CHLORIDE SERPL-SCNC: 101 MMOL/L (ref 98–107)
CHOLEST SERPL-MCNC: 177 MG/DL
CHOLEST/HDLC SERPL: 3 {RATIO} (ref 0–5)
CO2 SERPL-SCNC: 24 MMOL/L (ref 23–31)
CREAT SERPL-MCNC: 0.72 MG/DL (ref 0.73–1.18)
EOSINOPHIL # BLD AUTO: 0.28 X10(3)/MCL (ref 0–0.9)
EOSINOPHIL NFR BLD AUTO: 3.9 %
ERYTHROCYTE [DISTWIDTH] IN BLOOD BY AUTOMATED COUNT: 11.6 % (ref 11.5–17)
GFR SERPLBLD CREATININE-BSD FMLA CKD-EPI: >60 MLS/MIN/1.73/M2
GLOBULIN SER-MCNC: 4 GM/DL (ref 2.4–3.5)
GLUCOSE SERPL-MCNC: 72 MG/DL (ref 82–115)
HCT VFR BLD AUTO: 40.1 % (ref 42–52)
HDLC SERPL-MCNC: 59 MG/DL (ref 35–60)
HGB BLD-MCNC: 14.3 GM/DL (ref 14–18)
IMM GRANULOCYTES # BLD AUTO: 0.03 X10(3)/MCL (ref 0–0.04)
IMM GRANULOCYTES NFR BLD AUTO: 0.4 %
LDLC SERPL CALC-MCNC: 104 MG/DL (ref 50–140)
LYMPHOCYTES # BLD AUTO: 1.17 X10(3)/MCL (ref 0.6–4.6)
LYMPHOCYTES NFR BLD AUTO: 16.3 %
MCH RBC QN AUTO: 32.4 PG (ref 27–31)
MCHC RBC AUTO-ENTMCNC: 35.7 MG/DL (ref 33–36)
MCV RBC AUTO: 90.7 FL (ref 80–94)
MONOCYTES # BLD AUTO: 0.98 X10(3)/MCL (ref 0.1–1.3)
MONOCYTES NFR BLD AUTO: 13.6 %
NEUTROPHILS # BLD AUTO: 4.7 X10(3)/MCL (ref 2.1–9.2)
NEUTROPHILS NFR BLD AUTO: 65.2 %
NRBC BLD AUTO-RTO: 0 %
PLATELET # BLD AUTO: 257 X10(3)/MCL (ref 130–400)
PMV BLD AUTO: 8.9 FL (ref 7.4–10.4)
POTASSIUM SERPL-SCNC: 4.2 MMOL/L (ref 3.5–5.1)
PROT SERPL-MCNC: 7.8 GM/DL (ref 5.8–7.6)
RBC # BLD AUTO: 4.42 X10(6)/MCL (ref 4.7–6.1)
SODIUM SERPL-SCNC: 138 MMOL/L (ref 136–145)
T PALLIDUM AB SER QL: NONREACTIVE
TRIGL SERPL-MCNC: 71 MG/DL (ref 34–140)
TSH SERPL-ACNC: 4.39 UIU/ML (ref 0.35–4.94)
VLDLC SERPL CALC-MCNC: 14 MG/DL
WBC # SPEC AUTO: 7.2 X10(3)/MCL (ref 4.5–11.5)

## 2022-09-06 PROCEDURE — 11400000 HC PSYCH PRIVATE ROOM

## 2022-09-06 PROCEDURE — 36415 COLL VENOUS BLD VENIPUNCTURE: CPT | Performed by: PSYCHIATRY & NEUROLOGY

## 2022-09-06 PROCEDURE — 80053 COMPREHEN METABOLIC PANEL: CPT | Performed by: PSYCHIATRY & NEUROLOGY

## 2022-09-06 PROCEDURE — 25000003 PHARM REV CODE 250: Performed by: PSYCHIATRY & NEUROLOGY

## 2022-09-06 PROCEDURE — 80061 LIPID PANEL: CPT | Performed by: PSYCHIATRY & NEUROLOGY

## 2022-09-06 PROCEDURE — 85025 COMPLETE CBC W/AUTO DIFF WBC: CPT | Performed by: PSYCHIATRY & NEUROLOGY

## 2022-09-06 PROCEDURE — 25000003 PHARM REV CODE 250: Performed by: FAMILY MEDICINE

## 2022-09-06 PROCEDURE — 86780 TREPONEMA PALLIDUM: CPT | Performed by: PSYCHIATRY & NEUROLOGY

## 2022-09-06 PROCEDURE — 84443 ASSAY THYROID STIM HORMONE: CPT | Performed by: PSYCHIATRY & NEUROLOGY

## 2022-09-06 PROCEDURE — 25000003 PHARM REV CODE 250: Performed by: NURSE PRACTITIONER

## 2022-09-06 RX ORDER — TRAZODONE HYDROCHLORIDE 100 MG/1
200 TABLET ORAL NIGHTLY
Status: DISCONTINUED | OUTPATIENT
Start: 2022-09-06 | End: 2022-09-13 | Stop reason: HOSPADM

## 2022-09-06 RX ORDER — BUSPIRONE HYDROCHLORIDE 5 MG/1
5 TABLET ORAL 2 TIMES DAILY
Status: DISCONTINUED | OUTPATIENT
Start: 2022-09-06 | End: 2022-09-13 | Stop reason: HOSPADM

## 2022-09-06 RX ORDER — DICLOFENAC SODIUM 10 MG/G
4 GEL TOPICAL EVERY 8 HOURS PRN
Status: DISCONTINUED | OUTPATIENT
Start: 2022-09-06 | End: 2022-09-13 | Stop reason: HOSPADM

## 2022-09-06 RX ADMIN — BUSPIRONE HYDROCHLORIDE 5 MG: 5 TABLET ORAL at 08:09

## 2022-09-06 RX ADMIN — FOLIC ACID 1 MG: 1 TABLET ORAL at 08:09

## 2022-09-06 RX ADMIN — SERTRALINE HYDROCHLORIDE 50 MG: 50 TABLET ORAL at 08:09

## 2022-09-06 RX ADMIN — GABAPENTIN 200 MG: 100 CAPSULE ORAL at 04:09

## 2022-09-06 RX ADMIN — ACETAMINOPHEN 650 MG: 325 TABLET ORAL at 08:09

## 2022-09-06 RX ADMIN — LEVOTHYROXINE SODIUM 50 MCG: 50 TABLET ORAL at 07:09

## 2022-09-06 RX ADMIN — ACETAMINOPHEN 650 MG: 325 TABLET ORAL at 04:09

## 2022-09-06 RX ADMIN — ALUMINUM HYDROXIDE, MAGNESIUM HYDROXIDE, AND DIMETHICONE 30 ML: 200; 20; 200 SUSPENSION ORAL at 09:09

## 2022-09-06 RX ADMIN — THIAMINE HCL TAB 100 MG 100 MG: 100 TAB at 08:09

## 2022-09-06 RX ADMIN — MULTIPLE VITAMINS W/ MINERALS TAB 1 TABLET: TAB at 08:09

## 2022-09-06 RX ADMIN — TRAZODONE HYDROCHLORIDE 200 MG: 100 TABLET ORAL at 08:09

## 2022-09-06 RX ADMIN — ALUMINUM HYDROXIDE, MAGNESIUM HYDROXIDE, AND DIMETHICONE 30 ML: 200; 20; 200 SUSPENSION ORAL at 07:09

## 2022-09-06 RX ADMIN — ALUMINUM HYDROXIDE, MAGNESIUM HYDROXIDE, AND DIMETHICONE 30 ML: 200; 20; 200 SUSPENSION ORAL at 08:09

## 2022-09-06 RX ADMIN — GABAPENTIN 200 MG: 100 CAPSULE ORAL at 09:09

## 2022-09-06 NOTE — PROGRESS NOTES
"9/6/2022 3:30 PM   Bjorn Handy   1960   [id number]        Psychiatry Progress Note     SUBJECTIVE:   Bjorn Handy is a 62 y.o. male placed under a PEC at [facility] on [facility] due to reports that he felt like his depression medication was ineffective and he had been having suicidal ideation.  He reported a potential plan of buying a gun and shooting himself. On Gabapentin taper for suspected alcohol withdrawals and tolerating it well; VSS, although slightly hypertensive. He was started on Zoloft yesterday and reports that his mood today is "bad". He states, "I'm in so much pain". He is irritable that home medications were not restarted (Buspar, Trazodone, Lidocaine patch, Voltaren gel, Melatonin). Denies adverse effects to the medication. Denies suicidal ideations. He reports that [facility] [place], [place] [facility].       Current Medications:   Scheduled Meds:    aluminum-magnesium hydroxide-simethicone  30 mL Oral QID (AC & HS)    thiamine  100 mg Oral Daily    And    folic acid  1 mg Oral Daily    And    multivitamin  1 tablet Oral Daily    gabapentin  200 mg Oral TID    Followed by    [START ON 9/8/2022] gabapentin  100 mg Oral TID    levothyroxine  50 mcg Oral Before breakfast    sertraline  50 mg Oral Daily      PRN Meds: acetaminophen, aluminum-magnesium hydroxide-simethicone, haloperidoL **AND** diphenhydrAMINE **AND** LORazepam, diphenhydrAMINE, haloperidol lactate, hydrOXYzine HCL, lorazepam, magnesium hydroxide 400 mg/5 ml, magnesium hydroxide 400 mg/5 ml, trazodone   Psychotherapeutics (From admission, onward)      Start     Stop Route Frequency Ordered    09/04/22 1130  sertraline tablet 50 mg         -- Oral Daily 09/04/22 1123    09/04/22 0800  haloperidol lactate injection 10 mg         -- IM Every 4 hours PRN 09/04/22 0800    09/04/22 0800  lorazepam injection 2 mg         -- IM Every 4 hours PRN 09/04/22 0800    09/04/22 0800  " traZODone tablet 100 mg         -- Oral Nightly PRN 09/04/22 0800    09/04/22 0800  haloperidoL tablet 10 mg  (Med - Acute  Behavioral Management)        See Hyperspace for full Linked Orders Report.    -- Oral Every 4 hours PRN 09/04/22 0800    09/04/22 0800  LORazepam tablet 2 mg  (Med - Acute  Behavioral Management)        See Hyperspace for full Linked Orders Report.    -- Oral Every 4 hours PRN 09/04/22 0800            Allergies:   Review of patient's allergies indicates:   Allergen Reactions    Opioids - morphine analogues Anaphylaxis    Penicillins Anaphylaxis        OBJECTIVE:   Vitals   Vitals:    09/06/22 1100   BP: (!) 150/78   Pulse: 86   Resp: 16   Temp: 97.7 °F (36.5 °C)        Labs/Imaging/Studies:   Recent Results (from the past 36 hour(s))   Comprehensive metabolic panel    Collection Time: 09/06/22  8:35 AM   Result Value Ref Range    Sodium Level 138 136 - 145 mmol/L    Potassium Level 4.2 3.5 - 5.1 mmol/L    Chloride 101 98 - 107 mmol/L    Carbon Dioxide 24 23 - 31 mmol/L    Glucose Level 72 (L) 82 - 115 mg/dL    Blood Urea Nitrogen 16.4 8.4 - 25.7 mg/dL    Creatinine 0.72 (L) 0.73 - 1.18 mg/dL    Calcium Level Total 10.0 8.8 - 10.0 mg/dL    Protein Total 7.8 (H) 5.8 - 7.6 gm/dL    Albumin Level 3.8 3.4 - 4.8 gm/dL    Globulin 4.0 (H) 2.4 - 3.5 gm/dL    Albumin/Globulin Ratio 1.0 (L) 1.1 - 2.0 ratio    Bilirubin Total 0.5 <=1.5 mg/dL    Alkaline Phosphatase 116 40 - 150 unit/L    Alanine Aminotransferase 9 0 - 55 unit/L    Aspartate Aminotransferase 18 5 - 34 unit/L    eGFR >60 mls/min/1.73/m2   Lipid panel    Collection Time: 09/06/22  8:35 AM   Result Value Ref Range    Cholesterol Total 177 <=200 mg/dL    HDL Cholesterol 59 35 - 60 mg/dL    Triglyceride 71 34 - 140 mg/dL    Cholesterol/HDL Ratio 3 0 - 5    Very Low Density Lipoprotein 14     LDL Cholesterol 104.00 50.00 - 140.00 mg/dL   TSH    Collection Time: 09/06/22  8:35 AM   Result Value Ref Range    Thyroid Stimulating Hormone 4.3921  0.3500 - 4.9400 uIU/mL   CBC with Differential    Collection Time: 09/06/22  8:35 AM   Result Value Ref Range    WBC 7.2 4.5 - 11.5 x10(3)/mcL    RBC 4.42 (L) 4.70 - 6.10 x10(6)/mcL    Hgb 14.3 14.0 - 18.0 gm/dL    Hct 40.1 (L) 42.0 - 52.0 %    MCV 90.7 80.0 - 94.0 fL    MCH 32.4 (H) 27.0 - 31.0 pg    MCHC 35.7 33.0 - 36.0 mg/dL    RDW 11.6 11.5 - 17.0 %    Platelet 257 130 - 400 x10(3)/mcL    MPV 8.9 7.4 - 10.4 fL    Neut % 65.2 %    Lymph % 16.3 %    Mono % 13.6 %    Eos % 3.9 %    Basophil % 0.6 %    Lymph # 1.17 0.6 - 4.6 x10(3)/mcL    Neut # 4.7 2.1 - 9.2 x10(3)/mcL    Mono # 0.98 0.1 - 1.3 x10(3)/mcL    Eos # 0.28 0 - 0.9 x10(3)/mcL    Baso # 0.04 0 - 0.2 x10(3)/mcL    IG# 0.03 0 - 0.04 x10(3)/mcL    IG% 0.4 %    NRBC% 0.0 %        Psychiatric Mental Status Exam:  General Appearance: appears stated age, well-developed, well-nourished  Arousal: alert  Behavior: cooperative  Movements and Motor Activity: no abnormal involuntary movements noted  Orientation: oriented to person, place, time, and situation  Speech: normal rate, normal rhythm, normal volume, normal tone  Mood: Depressed  Affect: mood-congruent, constricted  Thought Process: linear  Associations: intact  Thought Content and Perceptions: no suicidal ideation, no homicidal ideation, no auditory hallucinations, no visual hallucinations, no paranoid ideation, no ideas of reference, no evidence of delusions or psychosis  Recent and Remote Memory: recent memory intact, remote memory intact  Attention and Concentration: intact, attentive to conversation  Fund of Knowledge: intact, aware of current events, vocabulary appropriate  Insight: intact  Judgment: intact    ASSESSMENT/PLAN:   Diagnosis:  Major Depressive Disorder, recurrent, severe (F33.2)  Insomnia (F51.01)  Alcohol use disorder, moderate (F10.20)  --R/o withdrawal    Past Medical History:   Diagnosis Date    Addiction to drug     Alcohol abuse     Anxiety     Bipolar disorder     Depression      Hallucination     Headache     History of psychiatric hospitalization     Hx of psychiatric care     Hypertension     Liver disease     Neuropathy     Psychiatric exam requested by authority     Psychiatric problem     Suicide attempt     Withdrawal symptoms, alcohol         Plan:  - Restart homes medications of Buspar, Trazodone, and Voltaren gel  - Follow-up with aftercare planning    Rafita Carbajal, PMP-BC  9/6/2022

## 2022-09-06 NOTE — PROGRESS NOTES
09/06/22 1000   [facility] Group Therapy   Group Name Therapeutic Recreation   Specific Interventions Skilled Activity Mild Exercises   Participation Level Active;Supportive;Appropriate;Attentive   Participation Quality Cooperative   Insight/Motivation Limited   Affect/Mood Display Appropriate   Cognition Oriented   Psychomotor WNL

## 2022-09-06 NOTE — PROGRESS NOTES
09/06/22 0900   General   Admit Date 09/04/22   Primary Diagnosis mood d/o   Secondary Diagnosis polysubstance abuse   Number of Children 3   Children Living? 3   Occupation unemployed   If you were to take part in activities, which of the following would you prefer? Both   Do you feel like you have enough to keep you busy now? Yes   Do you believe that you have the opportunity for physical activity? Yes   Activity Capabilities Minimum   Subjective   Patient states I came here to get my eye's checked out   Precautions   General Precautions vision impaired;other (see comments)  (Left eye cataract walkes with cane assist)   Orthopedic Yes   Required Braces or Orthoses No   Visual/Auditory Vision impaired   Assessment   Mobility ambulates with cane   Transfers independently   Visual Acuity impaired vision   Visual Perception left neglect   Hearing normal   Speech/Communication normal   Cognitive Concerns oriented x4   Emotional Concerns appears depressed;appears anxious   Leisure Interest Survey   Leisure Interest Survey No   Goals   Additional Documentation yes   Time For Goal Achievement 7 days   Goal 1 attend at least 3 TR groups prior to discharge   Plan   Planned Therapy Intervention Group Recreational Therapy   Expected Length of Stay 5-7 days   PT Frequency Minimum of 3 visits per week   Bjorn is a 62 male admitted for mood d/o & polysubstance abuse with a uds +cannabis and 214 BAL. Pt reports ability to perform his ADL's and is interested in MH, vision services & homelessness services..

## 2022-09-07 PROBLEM — M25.512 ACUTE PAIN OF LEFT SHOULDER: Status: ACTIVE | Noted: 2022-09-07

## 2022-09-07 PROCEDURE — 11400000 HC PSYCH PRIVATE ROOM

## 2022-09-07 PROCEDURE — 25000003 PHARM REV CODE 250: Performed by: PSYCHIATRY & NEUROLOGY

## 2022-09-07 PROCEDURE — 25000003 PHARM REV CODE 250: Performed by: NURSE PRACTITIONER

## 2022-09-07 PROCEDURE — 25000003 PHARM REV CODE 250: Performed by: FAMILY MEDICINE

## 2022-09-07 RX ORDER — DICLOFENAC SODIUM 10 MG/G
4 GEL TOPICAL 2 TIMES DAILY
Status: DISCONTINUED | OUTPATIENT
Start: 2022-09-07 | End: 2022-09-13 | Stop reason: HOSPADM

## 2022-09-07 RX ORDER — DIVALPROEX SODIUM 250 MG/1
250 TABLET, DELAYED RELEASE ORAL 3 TIMES DAILY
Status: DISCONTINUED | OUTPATIENT
Start: 2022-09-07 | End: 2022-09-13 | Stop reason: HOSPADM

## 2022-09-07 RX ORDER — IBUPROFEN 200 MG
1 TABLET ORAL DAILY
Status: DISCONTINUED | OUTPATIENT
Start: 2022-09-07 | End: 2022-09-13 | Stop reason: HOSPADM

## 2022-09-07 RX ORDER — LIDOCAINE 50 MG/G
1 PATCH TOPICAL
Status: DISCONTINUED | OUTPATIENT
Start: 2022-09-07 | End: 2022-09-07

## 2022-09-07 RX ADMIN — BUSPIRONE HYDROCHLORIDE 5 MG: 5 TABLET ORAL at 08:09

## 2022-09-07 RX ADMIN — TRAZODONE HYDROCHLORIDE 200 MG: 100 TABLET ORAL at 08:09

## 2022-09-07 RX ADMIN — DIVALPROEX SODIUM 250 MG: 250 TABLET, DELAYED RELEASE ORAL at 02:09

## 2022-09-07 RX ADMIN — GABAPENTIN 200 MG: 100 CAPSULE ORAL at 02:09

## 2022-09-07 RX ADMIN — DIVALPROEX SODIUM 250 MG: 250 TABLET, DELAYED RELEASE ORAL at 08:09

## 2022-09-07 RX ADMIN — SERTRALINE HYDROCHLORIDE 50 MG: 50 TABLET ORAL at 08:09

## 2022-09-07 RX ADMIN — LEVOTHYROXINE SODIUM 50 MCG: 50 TABLET ORAL at 06:09

## 2022-09-07 RX ADMIN — MULTIPLE VITAMINS W/ MINERALS TAB 1 TABLET: TAB at 08:09

## 2022-09-07 RX ADMIN — ACETAMINOPHEN 650 MG: 325 TABLET ORAL at 09:09

## 2022-09-07 RX ADMIN — THIAMINE HCL TAB 100 MG 100 MG: 100 TAB at 08:09

## 2022-09-07 RX ADMIN — FOLIC ACID 1 MG: 1 TABLET ORAL at 08:09

## 2022-09-07 RX ADMIN — GABAPENTIN 200 MG: 100 CAPSULE ORAL at 08:09

## 2022-09-07 RX ADMIN — ACETAMINOPHEN 650 MG: 325 TABLET ORAL at 02:09

## 2022-09-07 NOTE — CARE UPDATE
Treatment Team    Pt seem for treatment team today with interdisciplinary team.  Pt is Cooperative with Tx team. Pt reports physical health symptoms at this time. MD did not change pt meds at this time. Treatment teams goals Not met at this time. Pt DC plan is unknown at this time. DC date scheduled for Saturday.

## 2022-09-07 NOTE — PROGRESS NOTES
9/7/2022 3:30 PM   Bjorn Handy   1960   [id number]        Psychiatry Progress Note     SUBJECTIVE:   Bjorn Handy is a 62 y.o. male placed under a PEC at [facility] on [facility] due to reports that he felt like his depression medication was ineffective and he had been having suicidal ideation.  Attends and participates in groups.  Staff states that he has been unhappy because his lidocaine patches are not being given.  He had reported during his psychiatric evaluation that he rarely drinks, but he has been hypertensive with some tachycardia at times.  Today, he admits to more significant alcohol use and has been on Depakote.  His story is also very different today compared to 9/4.  It is difficult to sort how or why he went from [place] to [place] to [place].  Staff will contact his social work in [place].       Current Medications:   Scheduled Meds:    aluminum-magnesium hydroxide-simethicone  30 mL Oral QID (AC & HS)    artificial tears ointment   Both Eyes BID    busPIRone  5 mg Oral BID    diclofenac sodium  4 g Topical (Top) BID    thiamine  100 mg Oral Daily    And    folic acid  1 mg Oral Daily    And    multivitamin  1 tablet Oral Daily    gabapentin  200 mg Oral TID    Followed by    [START ON 9/8/2022] gabapentin  100 mg Oral TID    levothyroxine  50 mcg Oral Before breakfast    LIDOcaine  1 patch Transdermal Q24H    sertraline  50 mg Oral Daily    trazodone  200 mg Oral QHS      PRN Meds: acetaminophen, aluminum-magnesium hydroxide-simethicone, diclofenac sodium, haloperidoL **AND** diphenhydrAMINE **AND** LORazepam, diphenhydrAMINE, haloperidol lactate, hydrOXYzine HCL, lorazepam, magnesium hydroxide 400 mg/5 ml, magnesium hydroxide 400 mg/5 ml   Psychotherapeutics (From admission, onward)      Start     Stop Route Frequency Ordered    09/06/22 2100  busPIRone tablet 5 mg         -- Oral 2 times daily 09/06/22 1544    09/06/22 2100  traZODone tablet 200 mg          -- Oral Nightly 09/06/22 1544    09/04/22 1130  sertraline tablet 50 mg         -- Oral Daily 09/04/22 1123    09/04/22 0800  haloperidol lactate injection 10 mg         -- IM Every 4 hours PRN 09/04/22 0800    09/04/22 0800  lorazepam injection 2 mg         -- IM Every 4 hours PRN 09/04/22 0800    09/04/22 0800  haloperidoL tablet 10 mg  (Med - Acute  Behavioral Management)        See Hyperspace for full Linked Orders Report.    -- Oral Every 4 hours PRN 09/04/22 0800    09/04/22 0800  LORazepam tablet 2 mg  (Med - Acute  Behavioral Management)        See Hyperspace for full Linked Orders Report.    -- Oral Every 4 hours PRN 09/04/22 0800            Allergies:   Review of patient's allergies indicates:   Allergen Reactions    Opioids - morphine analogues Anaphylaxis    Penicillins Anaphylaxis        OBJECTIVE:   Vitals   Vitals:    09/07/22 0800   BP: (!) 147/69   Pulse: 103   Resp: 18   Temp: 97.9 °F (36.6 °C)        Labs/Imaging/Studies:   Recent Results (from the past 36 hour(s))   Comprehensive metabolic panel    Collection Time: 09/06/22  8:35 AM   Result Value Ref Range    Sodium Level 138 136 - 145 mmol/L    Potassium Level 4.2 3.5 - 5.1 mmol/L    Chloride 101 98 - 107 mmol/L    Carbon Dioxide 24 23 - 31 mmol/L    Glucose Level 72 (L) 82 - 115 mg/dL    Blood Urea Nitrogen 16.4 8.4 - 25.7 mg/dL    Creatinine 0.72 (L) 0.73 - 1.18 mg/dL    Calcium Level Total 10.0 8.8 - 10.0 mg/dL    Protein Total 7.8 (H) 5.8 - 7.6 gm/dL    Albumin Level 3.8 3.4 - 4.8 gm/dL    Globulin 4.0 (H) 2.4 - 3.5 gm/dL    Albumin/Globulin Ratio 1.0 (L) 1.1 - 2.0 ratio    Bilirubin Total 0.5 <=1.5 mg/dL    Alkaline Phosphatase 116 40 - 150 unit/L    Alanine Aminotransferase 9 0 - 55 unit/L    Aspartate Aminotransferase 18 5 - 34 unit/L    eGFR >60 mls/min/1.73/m2   SYPHILIS ANTIBODY (WITH REFLEX RPR)    Collection Time: 09/06/22  8:35 AM   Result Value Ref Range    Syphilis Antibody Nonreactive Nonreactive, Equivocal   Lipid panel     Collection Time: 09/06/22  8:35 AM   Result Value Ref Range    Cholesterol Total 177 <=200 mg/dL    HDL Cholesterol 59 35 - 60 mg/dL    Triglyceride 71 34 - 140 mg/dL    Cholesterol/HDL Ratio 3 0 - 5    Very Low Density Lipoprotein 14     LDL Cholesterol 104.00 50.00 - 140.00 mg/dL   TSH    Collection Time: 09/06/22  8:35 AM   Result Value Ref Range    Thyroid Stimulating Hormone 4.3921 0.3500 - 4.9400 uIU/mL   CBC with Differential    Collection Time: 09/06/22  8:35 AM   Result Value Ref Range    WBC 7.2 4.5 - 11.5 x10(3)/mcL    RBC 4.42 (L) 4.70 - 6.10 x10(6)/mcL    Hgb 14.3 14.0 - 18.0 gm/dL    Hct 40.1 (L) 42.0 - 52.0 %    MCV 90.7 80.0 - 94.0 fL    MCH 32.4 (H) 27.0 - 31.0 pg    MCHC 35.7 33.0 - 36.0 mg/dL    RDW 11.6 11.5 - 17.0 %    Platelet 257 130 - 400 x10(3)/mcL    MPV 8.9 7.4 - 10.4 fL    Neut % 65.2 %    Lymph % 16.3 %    Mono % 13.6 %    Eos % 3.9 %    Basophil % 0.6 %    Lymph # 1.17 0.6 - 4.6 x10(3)/mcL    Neut # 4.7 2.1 - 9.2 x10(3)/mcL    Mono # 0.98 0.1 - 1.3 x10(3)/mcL    Eos # 0.28 0 - 0.9 x10(3)/mcL    Baso # 0.04 0 - 0.2 x10(3)/mcL    IG# 0.03 0 - 0.04 x10(3)/mcL    IG% 0.4 %    NRBC% 0.0 %        Psychiatric Mental Status Exam:  General Appearance: appears stated age, well-developed, well-nourished  Arousal: alert  Behavior: cooperative  Movements and Motor Activity: no abnormal involuntary movements noted  Orientation: oriented to person, place, time, and situation  Speech: normal rate, normal rhythm, normal volume, normal tone  Mood: Depressed, improving  Affect: mood-congruent, constricted  Thought Process: linear  Associations: intact  Thought Content and Perceptions: no suicidal ideation, no homicidal ideation, no auditory hallucinations, no visual hallucinations, no paranoid ideation, no ideas of reference, no evidence of delusions or psychosis  Recent and Remote Memory: recent memory intact, remote memory intact  Attention and Concentration: intact, attentive to conversation  Fund  of Knowledge: intact, aware of current events, vocabulary appropriate  Insight: intact  Judgment: questionable    ASSESSMENT/PLAN:   Diagnosis:  Major Depressive Disorder, recurrent, severe (F33.2)  Insomnia (F51.01)  Alcohol use disorder, moderate (F10.239)    Past Medical History:   Diagnosis Date    Addiction to drug     Alcohol abuse     Anxiety     Bipolar disorder     Depression     Hallucination     Headache     History of psychiatric hospitalization     Hx of psychiatric care     Hypertension     Liver disease     Neuropathy     Psychiatric exam requested by authority     Psychiatric problem     Suicide attempt     Withdrawal symptoms, alcohol         Plan:  -Depakote DR 250mg TID    Dawit Doll M.D.

## 2022-09-07 NOTE — PROGRESS NOTES
09/06/22 1400   [facility] Group Therapy   Group Name Therapeutic Recreation   Specific Interventions Skilled Activity Creative Expression   Participation Level Active;Supportive;Appropriate;Attentive;Sharing   Participation Quality Cooperative;Social   Insight/Motivation Limited   Affect/Mood Display Appropriate   Cognition Oriented   Psychomotor WNL

## 2022-09-07 NOTE — HPI
Left shoulder pain for past 3-4 days.   Also has chronic arthritis in right hip.  Use Lidocaine 5% Patch Two patches to right hip once a day  And Diclofenac 1% Gel Topical for lower back that he would like to try on Left shoulder.  No trauma to shoulder

## 2022-09-07 NOTE — PLAN OF CARE
Bjorn attends TR groups, is cooperative but somatic & histrionic regarding [facility] housing and eye surgery, minimal interaction with peers, somatic with staff, and is unkempt & disheveled despite attending his ADL's.

## 2022-09-07 NOTE — PLAN OF CARE
Problem: Activity and Energy Impairment (Depressive Signs/Symptoms)  Goal: Optimized Energy Level (Depressive Signs/Symptoms)  Outcome: Ongoing, Progressing

## 2022-09-07 NOTE — PSYCH
"Patient stated he was brought to [place] and placed in a motel use to  he was to be having eye surgery.  Patient stated his shoulder has started hurting and don't know why.  Maybe I had a seizure and don't remember.  "I've never had a seizure before, but they have me on Depakote".    Dr Doll asked the patient what his plan on when he lives here.  Pt stated he does not know.  Patient states he has a cell phone and they call him once a week to check on him.    "

## 2022-09-07 NOTE — H&P
"Consult Note    Consults  SUBJECTIVE:     History of Present Illness:  Patient is a 62 y.o. male presents with Left Shoulder Pain. Onset of symptoms was abrupt starting 4 days ago with gradually worsening course since that time. Patient denies trauma or previous left shoulder pain. "It may just be arthristis. Symptoms are aggravated by moving shoulder. Symptoms improve with not moving shoulder or moving head down..    Review of patient's allergies indicates:   Allergen Reactions    Opioids - morphine analogues Anaphylaxis    Penicillins Anaphylaxis     Past Medical History:   Diagnosis Date    Addiction to drug     Alcohol abuse     Anxiety     Bipolar disorder     Depression     Hallucination     Headache     History of psychiatric hospitalization     Hx of psychiatric care     Hypertension     Liver disease     Neuropathy     Psychiatric exam requested by authority     Psychiatric problem     Suicide attempt     Withdrawal symptoms, alcohol      No past surgical history on file.  Family History   Family history unknown: Yes     Social History     Tobacco Use    Smoking status: Every Day     Packs/day: 1.50     Years: 15.00     Pack years: 22.50     Types: Cigarettes    Smokeless tobacco: Never   Substance Use Topics    Alcohol use: Yes     Alcohol/week: 7.0 standard drinks     Types: 7 Shots of liquor per week    Drug use: Yes     Types: Marijuana     Review of Systems   Constitutional: Negative.    HENT: Negative.     Respiratory: Negative.     Cardiovascular: Negative.    Gastrointestinal: Negative.    Genitourinary: Negative.    Musculoskeletal:  Positive for joint pain (Left Shoulder Pain x 3-4 days).   Skin: Negative.  Negative for rash.   Neurological: Negative.    Endo/Heme/Allergies: Negative.      OBJECTIVE:     Vital Signs:       Physical Exam  Neck:      Comments: Painful to flex neck to Left side near trapezius muscle and into shoulder area.  Cardiovascular:      Rate and Rhythm: Normal rate and " regular rhythm.   Pulmonary:      Effort: Pulmonary effort is normal.      Breath sounds: Normal breath sounds.   Abdominal:      General: Abdomen is flat. Bowel sounds are normal.      Palpations: Abdomen is soft.   Musculoskeletal:         General: Normal range of motion.      Left shoulder: No crepitus.      Cervical back: Normal range of motion.      Comments: LEFT shoulder: Pain with ROM. No warmth or rash noted.   Skin:     General: Skin is warm and dry.   Neurological:      General: No focal deficit present.      Mental Status: He is alert.     Appears to be in discomfort to shoulder  Chest: CTA  Heart: RRR  Abdomen: Benign  LEFT Shoulder: pain with ROM: Flexion, Extension or Abduction   No crepitus or warmth noted. No rash noted.    Laboratory:  Recent Results (from the past 48 hour(s))   Comprehensive metabolic panel    Collection Time: 09/06/22  8:35 AM   Result Value Ref Range    Sodium Level 138 136 - 145 mmol/L    Potassium Level 4.2 3.5 - 5.1 mmol/L    Chloride 101 98 - 107 mmol/L    Carbon Dioxide 24 23 - 31 mmol/L    Glucose Level 72 (L) 82 - 115 mg/dL    Blood Urea Nitrogen 16.4 8.4 - 25.7 mg/dL    Creatinine 0.72 (L) 0.73 - 1.18 mg/dL    Calcium Level Total 10.0 8.8 - 10.0 mg/dL    Protein Total 7.8 (H) 5.8 - 7.6 gm/dL    Albumin Level 3.8 3.4 - 4.8 gm/dL    Globulin 4.0 (H) 2.4 - 3.5 gm/dL    Albumin/Globulin Ratio 1.0 (L) 1.1 - 2.0 ratio    Bilirubin Total 0.5 <=1.5 mg/dL    Alkaline Phosphatase 116 40 - 150 unit/L    Alanine Aminotransferase 9 0 - 55 unit/L    Aspartate Aminotransferase 18 5 - 34 unit/L    eGFR >60 mls/min/1.73/m2   SYPHILIS ANTIBODY (WITH REFLEX RPR)    Collection Time: 09/06/22  8:35 AM   Result Value Ref Range    Syphilis Antibody Nonreactive Nonreactive, Equivocal   Lipid panel    Collection Time: 09/06/22  8:35 AM   Result Value Ref Range    Cholesterol Total 177 <=200 mg/dL    HDL Cholesterol 59 35 - 60 mg/dL    Triglyceride 71 34 - 140 mg/dL    Cholesterol/HDL Ratio 3 0 -  5    Very Low Density Lipoprotein 14     LDL Cholesterol 104.00 50.00 - 140.00 mg/dL   TSH    Collection Time: 09/06/22  8:35 AM   Result Value Ref Range    Thyroid Stimulating Hormone 4.3921 0.3500 - 4.9400 uIU/mL   CBC with Differential    Collection Time: 09/06/22  8:35 AM   Result Value Ref Range    WBC 7.2 4.5 - 11.5 x10(3)/mcL    RBC 4.42 (L) 4.70 - 6.10 x10(6)/mcL    Hgb 14.3 14.0 - 18.0 gm/dL    Hct 40.1 (L) 42.0 - 52.0 %    MCV 90.7 80.0 - 94.0 fL    MCH 32.4 (H) 27.0 - 31.0 pg    MCHC 35.7 33.0 - 36.0 mg/dL    RDW 11.6 11.5 - 17.0 %    Platelet 257 130 - 400 x10(3)/mcL    MPV 8.9 7.4 - 10.4 fL    Neut % 65.2 %    Lymph % 16.3 %    Mono % 13.6 %    Eos % 3.9 %    Basophil % 0.6 %    Lymph # 1.17 0.6 - 4.6 x10(3)/mcL    Neut # 4.7 2.1 - 9.2 x10(3)/mcL    Mono # 0.98 0.1 - 1.3 x10(3)/mcL    Eos # 0.28 0 - 0.9 x10(3)/mcL    Baso # 0.04 0 - 0.2 x10(3)/mcL    IG# 0.03 0 - 0.04 x10(3)/mcL    IG% 0.4 %    NRBC% 0.0 %         Diagnostic Results:      Patient Active Problem List   Diagnosis    Acute pain of left shoulder        ASSESSMENT/PLAN:     Resume Lidocaine 5% patch  And Diclofenac Sodium Topical Gel  And Refresh Eye Drops

## 2022-09-08 PROCEDURE — 25000003 PHARM REV CODE 250: Performed by: PSYCHIATRY & NEUROLOGY

## 2022-09-08 PROCEDURE — 25000003 PHARM REV CODE 250: Performed by: FAMILY MEDICINE

## 2022-09-08 PROCEDURE — 11400000 HC PSYCH PRIVATE ROOM

## 2022-09-08 PROCEDURE — 25000003 PHARM REV CODE 250: Performed by: NURSE PRACTITIONER

## 2022-09-08 PROCEDURE — S4991 NICOTINE PATCH NONLEGEND: HCPCS | Performed by: PSYCHIATRY & NEUROLOGY

## 2022-09-08 RX ADMIN — DICLOFENAC SODIUM 4 G: 10 GEL TOPICAL at 09:09

## 2022-09-08 RX ADMIN — DIVALPROEX SODIUM 250 MG: 250 TABLET, DELAYED RELEASE ORAL at 03:09

## 2022-09-08 RX ADMIN — GABAPENTIN 100 MG: 100 CAPSULE ORAL at 03:09

## 2022-09-08 RX ADMIN — DICLOFENAC SODIUM 4 G: 10 GEL TOPICAL at 11:09

## 2022-09-08 RX ADMIN — THIAMINE HCL TAB 100 MG 100 MG: 100 TAB at 08:09

## 2022-09-08 RX ADMIN — BUSPIRONE HYDROCHLORIDE 5 MG: 5 TABLET ORAL at 08:09

## 2022-09-08 RX ADMIN — ACETAMINOPHEN 650 MG: 325 TABLET ORAL at 08:09

## 2022-09-08 RX ADMIN — MULTIPLE VITAMINS W/ MINERALS TAB 1 TABLET: TAB at 08:09

## 2022-09-08 RX ADMIN — LEVOTHYROXINE SODIUM 50 MCG: 50 TABLET ORAL at 05:09

## 2022-09-08 RX ADMIN — SERTRALINE HYDROCHLORIDE 50 MG: 50 TABLET ORAL at 08:09

## 2022-09-08 RX ADMIN — NICOTINE 1 PATCH: 14 PATCH TRANSDERMAL at 11:09

## 2022-09-08 RX ADMIN — DIVALPROEX SODIUM 250 MG: 250 TABLET, DELAYED RELEASE ORAL at 08:09

## 2022-09-08 RX ADMIN — ALUMINUM HYDROXIDE, MAGNESIUM HYDROXIDE, AND DIMETHICONE 30 ML: 200; 20; 200 SUSPENSION ORAL at 08:09

## 2022-09-08 RX ADMIN — FOLIC ACID 1 MG: 1 TABLET ORAL at 08:09

## 2022-09-08 RX ADMIN — GABAPENTIN 100 MG: 100 CAPSULE ORAL at 09:09

## 2022-09-08 RX ADMIN — HYDROXYZINE HYDROCHLORIDE 50 MG: 50 TABLET, FILM COATED ORAL at 11:09

## 2022-09-08 RX ADMIN — TRAZODONE HYDROCHLORIDE 200 MG: 100 TABLET ORAL at 08:09

## 2022-09-08 NOTE — PROGRESS NOTES
09/08/22 1400   [facility] Group Therapy   Group Name Therapeutic Recreation   Specific Interventions Skilled Activity Creative Expression   Participation Level None   Participation Quality Other (see comments)  (excused)   Insight/Motivation None   Affect/Mood Display Unable to Assess   Cognition Unable to Assess   Psychomotor Unable to Assess

## 2022-09-08 NOTE — CARE UPDATE
Call made and voicemail left to pt's VA housing contact Robert Guzman -[phone] for more information on pt and any housing resources going forward.

## 2022-09-08 NOTE — CARE UPDATE
Pt spoke with VA worker filling in for Liya Jones at [phone]. Pt was originally thought to be accepted into facility in [place], [place]. However when CM called them, it was said that he had too many arrests on his background check. They denied pt admission. CM called Liya back and relayed this information. They are now beginning a new search for placement for pt. CM will cont to monitor and update as updates are given.

## 2022-09-08 NOTE — PROGRESS NOTES
2022 3:30 PM   Bjorn Handy   1960   [id number]        Psychiatry Progress Note     SUBJECTIVE:   Bjorn Handy is a 62 y.o. male placed under a PEC at [facility] on [facility] due to reports that he felt like his depression medication was ineffective and he had been having suicidal ideation.  It was learned that he was actually living with a girlfriend in [place] but she  and he was not longer able to live in the house.  Staff sent referral to [facility] but is also following up with the [facility] for [facility].       Current Medications:   Scheduled Meds:    aluminum-magnesium hydroxide-simethicone  30 mL Oral QID (AC & HS)    busPIRone  5 mg Oral BID    diclofenac sodium  4 g Topical (Top) BID    divalproex  250 mg Oral TID    thiamine  100 mg Oral Daily    And    folic acid  1 mg Oral Daily    And    multivitamin  1 tablet Oral Daily    gabapentin  100 mg Oral TID    levothyroxine  50 mcg Oral Before breakfast    nicotine  1 patch Transdermal Daily    sertraline  50 mg Oral Daily    trazodone  200 mg Oral QHS    white petrolatum-mineral oiL   Both Eyes BID      PRN Meds: acetaminophen, aluminum-magnesium hydroxide-simethicone, artificial tears, diclofenac sodium, haloperidoL **AND** diphenhydrAMINE **AND** LORazepam, diphenhydrAMINE, haloperidol lactate, hydrOXYzine HCL, lorazepam, magnesium hydroxide 400 mg/5 ml, magnesium hydroxide 400 mg/5 ml   Psychotherapeutics (From admission, onward)      Start     Stop Route Frequency Ordered    22 2100  busPIRone tablet 5 mg         -- Oral 2 times daily 22 1544    22 2100  traZODone tablet 200 mg         -- Oral Nightly 22 1544    22 1130  sertraline tablet 50 mg         -- Oral Daily 22 1123    22 08  haloperidol lactate injection 10 mg         -- IM Every 4 hours PRN 22 0800    22 0800  lorazepam injection 2 mg         -- IM Every 4 hours PRN 22 0800    22 0800   haloperidoL tablet 10 mg  (Med - Acute  Behavioral Management)        See Hyperspace for full Linked Orders Report.    -- Oral Every 4 hours PRN 09/04/22 0800    09/04/22 0800  LORazepam tablet 2 mg  (Med - Acute  Behavioral Management)        See Hyperspace for full Linked Orders Report.    -- Oral Every 4 hours PRN 09/04/22 0800            Allergies:   Review of patient's allergies indicates:   Allergen Reactions    Opioids - morphine analogues Anaphylaxis    Penicillins Anaphylaxis        OBJECTIVE:   Vitals   Vitals:    09/08/22 0700   BP: 131/84   Pulse: 106   Resp: 19   Temp: 97.2 °F (36.2 °C)        Labs/Imaging/Studies:   No results found for this or any previous visit (from the past 36 hour(s)).    Medical Review Of Systems:  Constitutional: negative  Respiratory: negative  Cardiovascular: negative  Gastrointestinal: negative  Genitourinary:negative  Musculoskeletal:shoulder pain  Neurological: negative         Psychiatric Mental Status Exam:  General Appearance: appears stated age, well-developed, well-nourished  Arousal: alert  Behavior: cooperative  Movements and Motor Activity: no abnormal involuntary movements noted  Orientation: oriented to person, place, time, and situation  Speech: normal rate, normal rhythm, normal volume, normal tone  Mood: Depressed, improving  Affect: mood-congruent, constricted  Thought Process: linear  Associations: intact  Thought Content and Perceptions: no suicidal ideation, no homicidal ideation, no auditory hallucinations, no visual hallucinations, no paranoid ideation, no ideas of reference, no evidence of delusions or psychosis  Recent and Remote Memory: recent memory intact, remote memory intact  Attention and Concentration: intact, attentive to conversation  Fund of Knowledge: intact, aware of current events, vocabulary appropriate  Insight: intact  Judgment: questionable    ASSESSMENT/PLAN:   Diagnosis:  Major Depressive Disorder, recurrent, severe (F33.2)  Insomnia  (F51.01)  Alcohol use disorder, moderate (F10.239)    Past Medical History:   Diagnosis Date    Addiction to drug     Alcohol abuse     Anxiety     Bipolar disorder     Depression     Hallucination     Headache     History of psychiatric hospitalization     Hx of psychiatric care     Hypertension     Liver disease     Neuropathy     Psychiatric exam requested by authority     Psychiatric problem     Suicide attempt     Withdrawal symptoms, alcohol         Plan:  -Continue current medication regimen  -Staff to continue following up with placement    Dawit Doll M.D.

## 2022-09-08 NOTE — PROGRESS NOTES
09/08/22 1000   [facility] Group Therapy   Group Name Therapeutic Recreation   Specific Interventions Skilled Activity Mild Exercises   Participation Level None   Participation Quality Withdrawn   Insight/Motivation None   Affect/Mood Display Irritable;Other (see comments)  (somatic & histronic)   Cognition Pre-Occupied;Confused   Psychomotor WNL

## 2022-09-08 NOTE — GROUP NOTE
Group Psychotherapy       Group Focus: Psychodynamic Group Psychotherapy      Number of patients in attendance: 21    Group Topic: Stress Management/Crisis Plan. Therapist assisted with understanding of treatment plan, identification of responsibilities for actions, assisted patients in identifying sources of support and developing awareness of crisis symptoms. Patient was able to examine benefits and consequences and make recommendations on how to handle crisis in the future. Pt continues to make progress towards goals.?    Group Start Time: 1045  Group End Time:  1115  Groups Date: 9/8/2022  Group Topic:  Behavioral Health  Group Department: [facility] Behavior[facility]  Group Facilitators:  Rory Potts LCSW  _____________________________________________________________________    Patient Name: Bjorn Handy  MRN: [id number]  Patient Class: IP- Psych   Admission Date\Time: 9/4/2022  8:00 AM  Hospital Length of Stay: 4  Primary Care Provider: Primary Doctor No     Referred by: Acute Psychiatry Unit Treatment Team     Target symptoms: Alcohol Abuse     Patient's response to treatment: Active Listening and Self-disclosure     Progress toward goals: Progressing well          Plan: Continue treatment on APU

## 2022-09-08 NOTE — CARE UPDATE
After getting pt's phone, he had a message from [facility]  in [facility] [phone]. She was able to provide background on pt.    Basically this is what happened. Mr. Milan is from the [facility]. He has a Hx of hospitalization, mental health concerns and alcohol use, etc. He can be forgetful. I normally check in with him weekly. His live-in girlfriend passed away and he had been admitted to the hospital following this. His friend took him to the [facility] because we do not have a full VA hospital in the [facility]. Just outpatient. He has gone to community hospitals here in the past, but this time he decided to go to a [facility] hospital. Part of his DC plan there was to find housing for him. He could no longer live in that house because it belonged to his girlfriend. So, he was essentially homeless. They got him set up with recovery housing in [facility]) when he got there, he could not fully ambulate or walk a block. Then it became apparent that he could not stay there due to this. They then put him in a [facility] and he has been in a hotel for about 3 weeks when they found that he could not stay in the program. Robert Megan is supposed to be setting up housing for him. However, I have not heard from him in about a week. I believe the long term goal is they are trying to get him transported to [place] where the VA/housing it is more accessible to him with his physical health issues. Typically, the [facility] like you guys work with the Transfer Office to help coordinate care for veterans.     She states that Mr. Jones in [facility] is the primary worker looking to find housing for pt. She and CM will cont to try to reach Robert. She says VA has private messaging system and she sent him a message there as well. Apparently, he spends a lot of time out in the field. CM left  for Robert earlier.

## 2022-09-08 NOTE — CARE UPDATE
Liya called back with this contact information   Vaishnavi Ruvalcaba- [phone] [facility] homeless [facility] in [place]. CM will check with them.

## 2022-09-09 PROCEDURE — 25000003 PHARM REV CODE 250: Performed by: FAMILY MEDICINE

## 2022-09-09 PROCEDURE — 11400000 HC PSYCH PRIVATE ROOM

## 2022-09-09 PROCEDURE — 25000003 PHARM REV CODE 250: Performed by: PSYCHIATRY & NEUROLOGY

## 2022-09-09 PROCEDURE — 25000003 PHARM REV CODE 250: Performed by: NURSE PRACTITIONER

## 2022-09-09 PROCEDURE — S4991 NICOTINE PATCH NONLEGEND: HCPCS | Performed by: PSYCHIATRY & NEUROLOGY

## 2022-09-09 RX ORDER — SERTRALINE HYDROCHLORIDE 50 MG/1
50 TABLET, FILM COATED ORAL DAILY
Qty: 30 TABLET | Refills: 0 | Status: SHIPPED | OUTPATIENT
Start: 2022-09-10 | End: 2022-10-10

## 2022-09-09 RX ORDER — GABAPENTIN 100 MG/1
100 CAPSULE ORAL 3 TIMES DAILY
Qty: 90 CAPSULE | Refills: 0 | Status: SHIPPED | OUTPATIENT
Start: 2022-09-09 | End: 2022-10-09

## 2022-09-09 RX ORDER — DIVALPROEX SODIUM 250 MG/1
250 TABLET, DELAYED RELEASE ORAL 3 TIMES DAILY
Qty: 90 TABLET | Refills: 0 | Status: SHIPPED | OUTPATIENT
Start: 2022-09-09 | End: 2022-10-09

## 2022-09-09 RX ORDER — BUSPIRONE HYDROCHLORIDE 5 MG/1
5 TABLET ORAL 2 TIMES DAILY
Qty: 60 TABLET | Refills: 0 | Status: SHIPPED | OUTPATIENT
Start: 2022-09-09 | End: 2022-10-09

## 2022-09-09 RX ORDER — TRAZODONE HYDROCHLORIDE 100 MG/1
200 TABLET ORAL NIGHTLY
Qty: 60 TABLET | Refills: 0 | Status: SHIPPED | OUTPATIENT
Start: 2022-09-09 | End: 2022-10-09

## 2022-09-09 RX ADMIN — BUSPIRONE HYDROCHLORIDE 5 MG: 5 TABLET ORAL at 08:09

## 2022-09-09 RX ADMIN — GABAPENTIN 100 MG: 100 CAPSULE ORAL at 09:09

## 2022-09-09 RX ADMIN — DIVALPROEX SODIUM 250 MG: 250 TABLET, DELAYED RELEASE ORAL at 03:09

## 2022-09-09 RX ADMIN — MULTIPLE VITAMINS W/ MINERALS TAB 1 TABLET: TAB at 09:09

## 2022-09-09 RX ADMIN — FOLIC ACID 1 MG: 1 TABLET ORAL at 09:09

## 2022-09-09 RX ADMIN — ACETAMINOPHEN 650 MG: 325 TABLET ORAL at 03:09

## 2022-09-09 RX ADMIN — GABAPENTIN 100 MG: 100 CAPSULE ORAL at 03:09

## 2022-09-09 RX ADMIN — ACETAMINOPHEN 650 MG: 325 TABLET ORAL at 09:09

## 2022-09-09 RX ADMIN — THIAMINE HCL TAB 100 MG 100 MG: 100 TAB at 09:09

## 2022-09-09 RX ADMIN — BUSPIRONE HYDROCHLORIDE 5 MG: 5 TABLET ORAL at 09:09

## 2022-09-09 RX ADMIN — DIVALPROEX SODIUM 250 MG: 250 TABLET, DELAYED RELEASE ORAL at 08:09

## 2022-09-09 RX ADMIN — HYDROXYZINE HYDROCHLORIDE 50 MG: 50 TABLET, FILM COATED ORAL at 09:09

## 2022-09-09 RX ADMIN — TRAZODONE HYDROCHLORIDE 200 MG: 100 TABLET ORAL at 08:09

## 2022-09-09 RX ADMIN — DICLOFENAC SODIUM 4 G: 10 GEL TOPICAL at 08:09

## 2022-09-09 RX ADMIN — SERTRALINE HYDROCHLORIDE 50 MG: 50 TABLET ORAL at 09:09

## 2022-09-09 RX ADMIN — NICOTINE 1 PATCH: 14 PATCH TRANSDERMAL at 09:09

## 2022-09-09 RX ADMIN — MINERAL OIL AND WHITE PETROLATUM: 150; 830 OINTMENT OPHTHALMIC at 09:09

## 2022-09-09 RX ADMIN — DICLOFENAC SODIUM 4 G: 10 GEL TOPICAL at 09:09

## 2022-09-09 RX ADMIN — DIVALPROEX SODIUM 250 MG: 250 TABLET, DELAYED RELEASE ORAL at 09:09

## 2022-09-09 NOTE — PLAN OF CARE
Problem: Skin Injury Risk Increased  Goal: Skin Health and Integrity  Outcome: Ongoing, Progressing     Problem: Activity and Energy Impairment (Depressive Signs/Symptoms)  Goal: Optimized Energy Level (Depressive Signs/Symptoms)  Outcome: Ongoing, Progressing     Problem: Cognitive Impairment (Depressive Signs/Symptoms)  Goal: Optimized Cognitive Function  Outcome: Ongoing, Progressing     Problem: Decreased Participation/Engagement (Depressive Signs/Symptoms)  Goal: Increased Participation and Engagement (Depressive Signs/Symptoms)  Outcome: Ongoing, Progressing     Problem: Feelings of Worthlessness, Hopelessness or Excessive Guilt (Depressive Signs/Symptoms)  Goal: Enhanced Self-Esteem and Confidence (Depressive Signs/Symptoms)  Outcome: Ongoing, Progressing     Problem: Mood Impairment (Depressive Signs/Symptoms)  Goal: Improved Mood Symptoms (Depressive Signs/Symptoms)  Outcome: Ongoing, Progressing     Problem: Nutrition Imbalance (Depressive Signs/Symptoms)  Goal: Optimized Nutrition Intake  Outcome: Ongoing, Progressing     Problem: Psychomotor Impairment (Depressive Signs/Symptoms)  Goal: Improved Psychomotor Symptoms (Depressive Signs/Symptoms)  Outcome: Ongoing, Progressing     Problem: Sleep Disturbance (Depressive Signs/Symptoms)  Goal: Improved Sleep (Depressive Signs/Symptoms)  Outcome: Ongoing, Progressing     Problem: Social, Occupational or Functional Impairment (Depressive Signs/Symptoms)  Goal: Enhanced Social, Occupational or Functional Skills (Depressive Signs/Symptoms)  Outcome: Ongoing, Progressing

## 2022-09-09 NOTE — PROGRESS NOTES
9/9/2022 2:33 PM   Bjorn Handy   1960   [id number]        Psychiatry Progress Note     SUBJECTIVE:   Bjorn Handy is a 62 y.o. male placed under a PEC at [facility] on [facility] due to reports that he felt like his depression medication was ineffective and he had been having suicidal ideation. Staff continues to work on placement to either [facility] or [facility]. He denies suicidal ideations today. Will continue current treatment plan.        Current Medications:   Scheduled Meds:    aluminum-magnesium hydroxide-simethicone  30 mL Oral QID (AC & HS)    busPIRone  5 mg Oral BID    diclofenac sodium  4 g Topical (Top) BID    divalproex  250 mg Oral TID    thiamine  100 mg Oral Daily    And    folic acid  1 mg Oral Daily    And    multivitamin  1 tablet Oral Daily    gabapentin  100 mg Oral TID    levothyroxine  50 mcg Oral Before breakfast    nicotine  1 patch Transdermal Daily    sertraline  50 mg Oral Daily    trazodone  200 mg Oral QHS    white petrolatum-mineral oiL   Both Eyes BID      PRN Meds: acetaminophen, aluminum-magnesium hydroxide-simethicone, artificial tears, diclofenac sodium, haloperidoL **AND** diphenhydrAMINE **AND** LORazepam, diphenhydrAMINE, haloperidol lactate, hydrOXYzine HCL, lorazepam, magnesium hydroxide 400 mg/5 ml, magnesium hydroxide 400 mg/5 ml   Psychotherapeutics (From admission, onward)      Start     Stop Route Frequency Ordered    09/06/22 2100  busPIRone tablet 5 mg         -- Oral 2 times daily 09/06/22 1544    09/06/22 2100  traZODone tablet 200 mg         -- Oral Nightly 09/06/22 1544    09/04/22 1130  sertraline tablet 50 mg         -- Oral Daily 09/04/22 1123    09/04/22 0800  haloperidol lactate injection 10 mg         -- IM Every 4 hours PRN 09/04/22 0800    09/04/22 0800  lorazepam injection 2 mg         -- IM Every 4 hours PRN 09/04/22 0800    09/04/22 0800  haloperidoL tablet 10 mg  (Med - Acute  Behavioral  Management)        See Hyperspace for full Linked Orders Report.    -- Oral Every 4 hours PRN 09/04/22 0800    09/04/22 0800  LORazepam tablet 2 mg  (Med - Acute  Behavioral Management)        See Hyperspace for full Linked Orders Report.    -- Oral Every 4 hours PRN 09/04/22 0800            Allergies:   Review of patient's allergies indicates:   Allergen Reactions    Opioids - morphine analogues Anaphylaxis    Penicillins Anaphylaxis        OBJECTIVE:   Vitals   Vitals:    09/08/22 0700   BP: 131/84   Pulse: 106   Resp: 19   Temp: 97.2 °F (36.2 °C)        Labs/Imaging/Studies:   No results found for this or any previous visit (from the past 36 hour(s)).     Psychiatric Mental Status Exam:  General Appearance: appears stated age, well-developed, well-nourished  Arousal: alert  Behavior: cooperative  Movements and Motor Activity: no abnormal involuntary movements noted  Orientation: oriented to person, place, time, and situation  Speech: normal rate, normal rhythm, normal volume, normal tone  Mood: Depressed, improving  Affect: mood-congruent, constricted  Thought Process: linear  Associations: intact  Thought Content and Perceptions: no suicidal ideation, no homicidal ideation, no auditory hallucinations, no visual hallucinations, no paranoid ideation, no ideas of reference, no evidence of delusions or psychosis  Recent and Remote Memory: recent memory intact, remote memory intact  Attention and Concentration: intact, attentive to conversation  Fund of Knowledge: intact, aware of current events, vocabulary appropriate  Insight: intact  Judgment: questionable       ASSESSMENT/PLAN:   Diagnosis:  Major Depressive Disorder, recurrent, severe (F33.2)  Insomnia (F51.01)  Alcohol use disorder, moderate (F10.239)    Past Medical History:   Diagnosis Date    Addiction to drug     Alcohol abuse     Anxiety     Bipolar disorder     Depression     Hallucination     Headache     History of psychiatric hospitalization     Hx of  psychiatric care     Hypertension     Liver disease     Neuropathy     Psychiatric exam requested by authority     Psychiatric problem     Suicide attempt     Withdrawal symptoms, alcohol         Plan:  -Continue current medication regimen  -Staff to continue following up with placement      JAYESH GalvezBC  9/9/2022

## 2022-09-09 NOTE — GROUP NOTE
Group Psychotherapy       Group Focus: Stress Management      Number of patients in attendance: 8    Group Start Time: 1045  Group End Time:  1130  Groups Date: 9/9/2022  Group Topic:  Behavioral Health  Group Department: [facility]  Group Facilitators:  Zulema Wheeler  _____________________________________________________________________    Patient Name: Bjonr Handy  MRN: [id number]  Patient Class: IP- Psych   Admission Date\Time: 9/4/2022  8:00 AM  Hospital Length of Stay: 5  Primary Care Provider: Primary Doctor No     Referred by: Behavioral Medicine Unit Treatment Team     Target symptoms: Substance Abuse and Depression     Patient's response to treatment: Self-disclosure     Progress toward goals: Progressing adequately     Interval History:      Diagnosis:      Plan: Continue treatment on BMU

## 2022-09-09 NOTE — PROGRESS NOTES
09/09/22 1000   [facility] Group Therapy   Group Name Therapeutic Recreation   Specific Interventions Skilled Activity Mild Exercises   Participation Level None   Participation Quality Restive   Insight/Motivation None   Affect/Mood Display Blunted;Depressed   Cognition Pre-Occupied   Psychomotor WNL

## 2022-09-09 NOTE — CARE UPDATE
DAYRON spoke with pt's [facility] worker Liya. She is beginning process to get pt admitted into [facility] program for substance use treatment. She states [facility] contract with [facility]. States that there is a ride share program that could possibly transport pt from [place] to [place]. DAYRON will cont to monitor and update.

## 2022-09-10 PROCEDURE — 11400000 HC PSYCH PRIVATE ROOM

## 2022-09-10 PROCEDURE — 25000003 PHARM REV CODE 250: Performed by: FAMILY MEDICINE

## 2022-09-10 PROCEDURE — 25000003 PHARM REV CODE 250: Performed by: PSYCHIATRY & NEUROLOGY

## 2022-09-10 PROCEDURE — S4991 NICOTINE PATCH NONLEGEND: HCPCS | Performed by: PSYCHIATRY & NEUROLOGY

## 2022-09-10 PROCEDURE — 25000003 PHARM REV CODE 250: Performed by: NURSE PRACTITIONER

## 2022-09-10 RX ADMIN — DIVALPROEX SODIUM 250 MG: 250 TABLET, DELAYED RELEASE ORAL at 08:09

## 2022-09-10 RX ADMIN — ACETAMINOPHEN 650 MG: 325 TABLET ORAL at 04:09

## 2022-09-10 RX ADMIN — SERTRALINE HYDROCHLORIDE 50 MG: 50 TABLET ORAL at 08:09

## 2022-09-10 RX ADMIN — MINERAL OIL AND WHITE PETROLATUM: 150; 830 OINTMENT OPHTHALMIC at 09:09

## 2022-09-10 RX ADMIN — LEVOTHYROXINE SODIUM 50 MCG: 50 TABLET ORAL at 06:09

## 2022-09-10 RX ADMIN — MULTIPLE VITAMINS W/ MINERALS TAB 1 TABLET: TAB at 08:09

## 2022-09-10 RX ADMIN — THIAMINE HCL TAB 100 MG 100 MG: 100 TAB at 08:09

## 2022-09-10 RX ADMIN — FOLIC ACID 1 MG: 1 TABLET ORAL at 08:09

## 2022-09-10 RX ADMIN — ALUMINUM HYDROXIDE, MAGNESIUM HYDROXIDE, AND DIMETHICONE 30 ML: 200; 20; 200 SUSPENSION ORAL at 08:09

## 2022-09-10 RX ADMIN — NICOTINE 1 PATCH: 14 PATCH TRANSDERMAL at 08:09

## 2022-09-10 RX ADMIN — ACETAMINOPHEN 650 MG: 325 TABLET ORAL at 08:09

## 2022-09-10 RX ADMIN — DICLOFENAC SODIUM 4 G: 10 GEL TOPICAL at 08:09

## 2022-09-10 RX ADMIN — TRAZODONE HYDROCHLORIDE 200 MG: 100 TABLET ORAL at 08:09

## 2022-09-10 RX ADMIN — BUSPIRONE HYDROCHLORIDE 5 MG: 5 TABLET ORAL at 08:09

## 2022-09-10 RX ADMIN — DIVALPROEX SODIUM 250 MG: 250 TABLET, DELAYED RELEASE ORAL at 04:09

## 2022-09-10 NOTE — GROUP NOTE
Group Psychotherapy       Group Focus: Strength Training      Number of patients in attendance: 20    Group Start Time: 1030  Group End Time:  1145  Groups Date: 9/10/2022  Group Topic:  Behavioral Health  Group Department: [facility]  Group Facilitators:  Carl Sorensen LMSW  _____________________________________________________________________    Patient Name: Bjorn Handy  MRN: [id number]  Patient Class: IP- Psych   Admission Date\Time: 9/4/2022  8:00 AM  Hospital Length of Stay: 6  Primary Care Provider: Primary Doctor No     Referred by: Behavioral Medicine Unit Treatment Team     Target symptoms: Substance Abuse and Depression     Patient's response to treatment: Not Participating     Progress toward goals: Not progressing     Interval History:      Diagnosis: Depression     Plan: Continue treatment on BMU

## 2022-09-11 PROCEDURE — 25000003 PHARM REV CODE 250: Performed by: FAMILY MEDICINE

## 2022-09-11 PROCEDURE — 25000003 PHARM REV CODE 250: Performed by: NURSE PRACTITIONER

## 2022-09-11 PROCEDURE — 11400000 HC PSYCH PRIVATE ROOM

## 2022-09-11 PROCEDURE — S4991 NICOTINE PATCH NONLEGEND: HCPCS | Performed by: PSYCHIATRY & NEUROLOGY

## 2022-09-11 PROCEDURE — 25000003 PHARM REV CODE 250: Performed by: PSYCHIATRY & NEUROLOGY

## 2022-09-11 RX ADMIN — DICLOFENAC SODIUM 4 G: 10 GEL TOPICAL at 09:09

## 2022-09-11 RX ADMIN — HYDROXYZINE HYDROCHLORIDE 50 MG: 50 TABLET, FILM COATED ORAL at 08:09

## 2022-09-11 RX ADMIN — LEVOTHYROXINE SODIUM 50 MCG: 50 TABLET ORAL at 06:09

## 2022-09-11 RX ADMIN — FOLIC ACID 1 MG: 1 TABLET ORAL at 08:09

## 2022-09-11 RX ADMIN — ACETAMINOPHEN 650 MG: 325 TABLET ORAL at 03:09

## 2022-09-11 RX ADMIN — DICLOFENAC SODIUM 4 G: 10 GEL TOPICAL at 08:09

## 2022-09-11 RX ADMIN — BUSPIRONE HYDROCHLORIDE 5 MG: 5 TABLET ORAL at 08:09

## 2022-09-11 RX ADMIN — DIVALPROEX SODIUM 250 MG: 250 TABLET, DELAYED RELEASE ORAL at 08:09

## 2022-09-11 RX ADMIN — ALUMINUM HYDROXIDE, MAGNESIUM HYDROXIDE, AND DIMETHICONE 30 ML: 200; 20; 200 SUSPENSION ORAL at 06:09

## 2022-09-11 RX ADMIN — THIAMINE HCL TAB 100 MG 100 MG: 100 TAB at 08:09

## 2022-09-11 RX ADMIN — SERTRALINE HYDROCHLORIDE 50 MG: 50 TABLET ORAL at 08:09

## 2022-09-11 RX ADMIN — NICOTINE 1 PATCH: 14 PATCH TRANSDERMAL at 09:09

## 2022-09-11 RX ADMIN — MULTIPLE VITAMINS W/ MINERALS TAB 1 TABLET: TAB at 08:09

## 2022-09-11 RX ADMIN — TRAZODONE HYDROCHLORIDE 200 MG: 100 TABLET ORAL at 08:09

## 2022-09-11 RX ADMIN — ACETAMINOPHEN 650 MG: 325 TABLET ORAL at 08:09

## 2022-09-11 RX ADMIN — DIVALPROEX SODIUM 250 MG: 250 TABLET, DELAYED RELEASE ORAL at 02:09

## 2022-09-11 NOTE — PLAN OF CARE
Problem: Activity and Energy Impairment (Depressive Signs/Symptoms)  Goal: Optimized Energy Level (Depressive Signs/Symptoms)  Outcome: Ongoing, Progressing     Problem: Cognitive Impairment (Depressive Signs/Symptoms)  Goal: Optimized Cognitive Function  Outcome: Ongoing, Progressing     Problem: Decreased Participation/Engagement (Depressive Signs/Symptoms)  Goal: Increased Participation and Engagement (Depressive Signs/Symptoms)  Outcome: Ongoing, Progressing     Problem: Feelings of Worthlessness, Hopelessness or Excessive Guilt (Depressive Signs/Symptoms)  Goal: Enhanced Self-Esteem and Confidence (Depressive Signs/Symptoms)  Outcome: Ongoing, Progressing     Problem: Mood Impairment (Depressive Signs/Symptoms)  Goal: Improved Mood Symptoms (Depressive Signs/Symptoms)  Outcome: Ongoing, Progressing     Problem: Nutrition Imbalance (Depressive Signs/Symptoms)  Goal: Optimized Nutrition Intake  Outcome: Ongoing, Progressing     Problem: Psychomotor Impairment (Depressive Signs/Symptoms)  Goal: Improved Psychomotor Symptoms (Depressive Signs/Symptoms)  Outcome: Ongoing, Progressing     Problem: Sleep Disturbance (Depressive Signs/Symptoms)  Goal: Improved Sleep (Depressive Signs/Symptoms)  Outcome: Ongoing, Progressing     Problem: Social, Occupational or Functional Impairment (Depressive Signs/Symptoms)  Goal: Enhanced Social, Occupational or Functional Skills (Depressive Signs/Symptoms)  Outcome: Ongoing, Progressing     Problem: Skin Injury Risk Increased  Goal: Skin Health and Integrity  Outcome: Ongoing, Progressing

## 2022-09-12 VITALS
SYSTOLIC BLOOD PRESSURE: 134 MMHG | WEIGHT: 178.81 LBS | OXYGEN SATURATION: 95 % | HEART RATE: 90 BPM | BODY MASS INDEX: 26.48 KG/M2 | RESPIRATION RATE: 18 BRPM | DIASTOLIC BLOOD PRESSURE: 80 MMHG | TEMPERATURE: 98 F | HEIGHT: 69 IN

## 2022-09-12 PROCEDURE — S4991 NICOTINE PATCH NONLEGEND: HCPCS | Performed by: PSYCHIATRY & NEUROLOGY

## 2022-09-12 PROCEDURE — 25000003 PHARM REV CODE 250: Performed by: NURSE PRACTITIONER

## 2022-09-12 PROCEDURE — 25000003 PHARM REV CODE 250: Performed by: PSYCHIATRY & NEUROLOGY

## 2022-09-12 PROCEDURE — 11400000 HC PSYCH PRIVATE ROOM

## 2022-09-12 PROCEDURE — 25000003 PHARM REV CODE 250: Performed by: FAMILY MEDICINE

## 2022-09-12 RX ADMIN — DIVALPROEX SODIUM 250 MG: 250 TABLET, DELAYED RELEASE ORAL at 08:09

## 2022-09-12 RX ADMIN — LEVOTHYROXINE SODIUM 50 MCG: 50 TABLET ORAL at 06:09

## 2022-09-12 RX ADMIN — MULTIPLE VITAMINS W/ MINERALS TAB 1 TABLET: TAB at 08:09

## 2022-09-12 RX ADMIN — ALUMINUM HYDROXIDE, MAGNESIUM HYDROXIDE, AND DIMETHICONE 30 ML: 200; 20; 200 SUSPENSION ORAL at 11:09

## 2022-09-12 RX ADMIN — HYDROXYZINE HYDROCHLORIDE 50 MG: 50 TABLET, FILM COATED ORAL at 08:09

## 2022-09-12 RX ADMIN — ACETAMINOPHEN 650 MG: 325 TABLET ORAL at 04:09

## 2022-09-12 RX ADMIN — ACETAMINOPHEN 650 MG: 325 TABLET ORAL at 08:09

## 2022-09-12 RX ADMIN — BUSPIRONE HYDROCHLORIDE 5 MG: 5 TABLET ORAL at 08:09

## 2022-09-12 RX ADMIN — DIVALPROEX SODIUM 250 MG: 250 TABLET, DELAYED RELEASE ORAL at 03:09

## 2022-09-12 RX ADMIN — FOLIC ACID 1 MG: 1 TABLET ORAL at 08:09

## 2022-09-12 RX ADMIN — DICLOFENAC SODIUM 4 G: 10 GEL TOPICAL at 08:09

## 2022-09-12 RX ADMIN — TRAZODONE HYDROCHLORIDE 200 MG: 100 TABLET ORAL at 08:09

## 2022-09-12 RX ADMIN — SERTRALINE HYDROCHLORIDE 50 MG: 50 TABLET ORAL at 08:09

## 2022-09-12 RX ADMIN — NICOTINE 1 PATCH: 14 PATCH TRANSDERMAL at 08:09

## 2022-09-12 RX ADMIN — DICLOFENAC SODIUM 4 G: 10 GEL TOPICAL at 09:09

## 2022-09-12 RX ADMIN — THIAMINE HCL TAB 100 MG 100 MG: 100 TAB at 08:09

## 2022-09-12 NOTE — PROGRESS NOTES
09/12/22 1000   [facility] Group Therapy   Group Name Therapeutic Recreation   Specific Interventions Skilled Activity Mild Exercises   Participation Level None   Participation Quality Withdrawn   Insight/Motivation None   Affect/Mood Display Blunted   Cognition Oriented   Psychomotor WNL

## 2022-09-12 NOTE — GROUP NOTE
Group Psychotherapy       Group Focus: Group topic: Coping Skills: Therapist explored patients need for increase in effective coping skills to deal with stress or anxiety. Patient was able to discuss ways to learn new coping skills and implement them in the future. Patient continues to make progress towards treatment goals.       Number of patients in attendance: 18    Group Start Time: 1045  Group End Time:  1130  Groups Date: 9/12/2022  Group Topic:  Behavioral Health  Group Department: [facility]  Group Facilitators:  DALIA Rothman  _____________________________________________________________________    Patient Name: Bjorn Handy  MRN: [id number]  Patient Class: IP- Psych   Admission Date\Time: 9/4/2022  8:00 AM  Hospital Length of Stay: 8  Primary Care Provider: Primary Doctor No     Referred by: Acute Psychiatry Unit Treatment Team     Target symptoms: Substance Abuse and Depression     Patient's response to treatment: Active Listening     Progress toward goals: Progressing adequately     Interval History:         Diagnosis:       Plan: Continue treatment on ABU

## 2022-09-12 NOTE — CARE UPDATE
"DAYRON spoke back with  Liya from the [facility]. She now states that pt does not have full medical benefits under [facility], that he is a "humanitarian" status. What this means is that he is not eligible for the [facility] network or for the VA programs we have been looking into. He basically like any other non-insured pt. DAYRON sent referral for rehab to [facility] for pt since they provide transportation and take uninsured patients. DAYRON will cont to monitor.   "

## 2022-09-12 NOTE — PROGRESS NOTES
"Inpatient Nutrition Evaluation    Admit Date: 2022   Total duration of encounter: 8 days    Nutrition Recommendation/Prescription     Continue Regular diet as tolerated  Continue MVI, folic acid, thiamine supplementation -- etoh abuse  Monitor po intake, wt    Nutrition Assessment     Chart Review    Reason Seen: length of stay    Diagnosis: Major Depressive Disorder, recurrent, severe   Insomnia   Alcohol use disorder, moderate   R/o Alcohol-induced neurocognitive impairment      Relevant Medical History: alcohol abuse, anxiety, depression, bipolar disorder, HTN, liver disease, neuropathy    Nutrition-Related Medications: MVI, folic acid, thiamine, levothyroxine, mylanta    Nutrition-Related Labs: No recent nutrition related labs noted      Diet Order: Diet Adult Regular  Oral Supplement Order: none at this time  Appetite/Oral Intake: good/% of meals  Factors Affecting Nutritional Intake: none identified at this time  Food/Advent/Cultural Preferences: none reported       Wound(s):   N/A    Comments  : Pt reports good po intake, consuming 100% of meals. Pt denies any N/V/D/C. Pt reports good po intake and no recent wt loss pta; reports wt gain. Pt states UBW ~170#.      Anthropometrics    Height: 5' 9" (175.3 cm) Height Method: Measured  Last Weight: 81.1 kg (178 lb 12.8 oz) (22 1508) Weight Method: Standard Scale  BMI (Calculated): 26.4  BMI Classification: overweight (BMI 25-29.9)        Ideal Body Weight (IBW), Male: 160 lb  % Ideal Body Weight, Male (lb): 111.75 %           Usual Body Weight (UBW), k.27 kg  % Usual Body Weight: 105.18    Wt Readings from Last 5 Encounters:   22 81.1 kg (178 lb 12.8 oz)   22 80 kg (176 lb 5.9 oz)     Weight Change(s) Since Admission:  Admit Weight: 81.1 kg (178 lb 12.8 oz) (22 0710)      Patient Education    Not applicable.    Monitoring & Evaluation     Dietitian will monitor food and beverage intake and weight change.  Nutrition " Risk/Follow-Up: low (follow-up in 5-7 days)  Patients assigned 'low nutrition risk' status do not qualify for a full nutritional assessment but will be monitored and re-evaluated in a 5-7 day time period.

## 2022-09-12 NOTE — CARE UPDATE
[facility] continues to try to coordinate placement for pt. Last call was from Mr. Bairon Ramirez with the Substance use treatment dept in [place] ([phone]) He is coordinating with [facility] to get files on pt to help with placement. CM will cont to monitor.

## 2022-09-12 NOTE — PLAN OF CARE
Problem: Skin Injury Risk Increased  Goal: Skin Health and Integrity  Outcome: Met     Problem: Activity and Energy Impairment (Depressive Signs/Symptoms)  Goal: Optimized Energy Level (Depressive Signs/Symptoms)  Outcome: Met     Problem: Cognitive Impairment (Depressive Signs/Symptoms)  Goal: Optimized Cognitive Function  Outcome: Met     Problem: Decreased Participation/Engagement (Depressive Signs/Symptoms)  Goal: Increased Participation and Engagement (Depressive Signs/Symptoms)  Outcome: Met     Problem: Feelings of Worthlessness, Hopelessness or Excessive Guilt (Depressive Signs/Symptoms)  Goal: Enhanced Self-Esteem and Confidence (Depressive Signs/Symptoms)  Outcome: Met     Problem: Mood Impairment (Depressive Signs/Symptoms)  Goal: Improved Mood Symptoms (Depressive Signs/Symptoms)  Outcome: Met     Problem: Nutrition Imbalance (Depressive Signs/Symptoms)  Goal: Optimized Nutrition Intake  Outcome: Met     Problem: Psychomotor Impairment (Depressive Signs/Symptoms)  Goal: Improved Psychomotor Symptoms (Depressive Signs/Symptoms)  Outcome: Met     Problem: Sleep Disturbance (Depressive Signs/Symptoms)  Goal: Improved Sleep (Depressive Signs/Symptoms)  Outcome: Met     Problem: Social, Occupational or Functional Impairment (Depressive Signs/Symptoms)  Goal: Enhanced Social, Occupational or Functional Skills (Depressive Signs/Symptoms)  Outcome: Met

## 2022-09-12 NOTE — CARE UPDATE
[facility] and [facility] are in last phase of process to get patient accepted into their program. [facility] is awaiting a consult from the [facility], where they may ask pt some questions. We are awaiting an official response, but it is likely pt will be able to get into [facility] by tomorrow 9/13/22. CM will cont to monitor.

## 2022-09-12 NOTE — PROGRESS NOTES
9/12/2022 3:30 PM   Bjorn Handy   1960   [id number]        Psychiatry Progress Note     SUBJECTIVE:   Bjorn Handy is a 62 y.o. male placed under a PEC at [facility] on [facility] due to reports that he felt like his depression medication was ineffective and he had been having suicidal ideation.  No acute behavioral issues.  Was due for discharge today but staff has still been working with the [facility] regarding placement and aftercare.  Will f/u with this.       Current Medications:   Scheduled Meds:    aluminum-magnesium hydroxide-simethicone  30 mL Oral QID (AC & HS)    busPIRone  5 mg Oral BID    diclofenac sodium  4 g Topical (Top) BID    divalproex  250 mg Oral TID    thiamine  100 mg Oral Daily    And    folic acid  1 mg Oral Daily    And    multivitamin  1 tablet Oral Daily    levothyroxine  50 mcg Oral Before breakfast    nicotine  1 patch Transdermal Daily    sertraline  50 mg Oral Daily    trazodone  200 mg Oral QHS    white petrolatum-mineral oiL   Both Eyes BID      PRN Meds: acetaminophen, aluminum-magnesium hydroxide-simethicone, artificial tears, diclofenac sodium, haloperidoL **AND** diphenhydrAMINE **AND** LORazepam, diphenhydrAMINE, haloperidol lactate, hydrOXYzine HCL, lorazepam, magnesium hydroxide 400 mg/5 ml, magnesium hydroxide 400 mg/5 ml   Psychotherapeutics (From admission, onward)      Start     Stop Route Frequency Ordered    09/06/22 2100  busPIRone tablet 5 mg         -- Oral 2 times daily 09/06/22 1544    09/06/22 2100  traZODone tablet 200 mg         -- Oral Nightly 09/06/22 1544    09/04/22 1130  sertraline tablet 50 mg         -- Oral Daily 09/04/22 1123    09/04/22 0800  haloperidol lactate injection 10 mg         -- IM Every 4 hours PRN 09/04/22 0800    09/04/22 0800  lorazepam injection 2 mg         -- IM Every 4 hours PRN 09/04/22 0800    09/04/22 0800  haloperidoL tablet 10 mg  (Med - Acute  Behavioral Management)        See Hyperspace for full  Linked Orders Report.    -- Oral Every 4 hours PRN 09/04/22 0800    09/04/22 0800  LORazepam tablet 2 mg  (Med - Acute  Behavioral Management)        See Hyperspace for full Linked Orders Report.    -- Oral Every 4 hours PRN 09/04/22 0800            Allergies:   Review of patient's allergies indicates:   Allergen Reactions    Opioids - morphine analogues Anaphylaxis    Penicillins Anaphylaxis        OBJECTIVE:   Vitals   Vitals:    09/12/22 0701   BP: 134/80   Pulse: 90   Resp: 18   Temp: 98.1 °F (36.7 °C)        Labs/Imaging/Studies:   No results found for this or any previous visit (from the past 36 hour(s)).    Medical Review Of Systems:  Constitutional: negative  Respiratory: negative  Cardiovascular: negative  Gastrointestinal: negative  Genitourinary:negative  Musculoskeletal:shoulder pain  Neurological: negative         Psychiatric Mental Status Exam:  General Appearance: appears stated age, well-developed, well-nourished  Arousal: alert  Behavior: cooperative  Movements and Motor Activity: no abnormal involuntary movements noted  Orientation: oriented to person, place, time, and situation  Speech: normal rate, normal rhythm, normal volume, normal tone  Mood: Depressed, improving  Affect: mood-congruent, constricted  Thought Process: linear  Associations: intact  Thought Content and Perceptions: no suicidal ideation, no homicidal ideation, no auditory hallucinations, no visual hallucinations, no paranoid ideation, no ideas of reference, no evidence of delusions or psychosis  Recent and Remote Memory: recent memory intact, remote memory intact  Attention and Concentration: intact, attentive to conversation  Fund of Knowledge: intact, aware of current events, vocabulary appropriate  Insight: intact  Judgment: questionable    ASSESSMENT/PLAN:   Diagnosis:  Major Depressive Disorder, recurrent, severe (F33.2)  Insomnia (F51.01)  Alcohol use disorder, moderate (F10.239)  R/o Alcohol-induced neurocognitive  impairment    Past Medical History:   Diagnosis Date    Addiction to drug     Alcohol abuse     Anxiety     Bipolar disorder     Depression     Hallucination     Headache     History of psychiatric hospitalization     Hx of psychiatric care     Hypertension     Liver disease     Neuropathy     Psychiatric exam requested by authority     Psychiatric problem     Suicide attempt     Withdrawal symptoms, alcohol         Plan:  -Staff to f/u with [facility] for discharge planning    Dawit Doll M.D.

## 2022-09-12 NOTE — PROGRESS NOTES
09/12/22 1400   [facility] Group Therapy   Group Name Therapeutic Recreation   Specific Interventions Skilled Activity Creative Expression   Participation Level None   Participation Quality Withdrawn   Insight/Motivation Limited   Affect/Mood Display Blunted;Depressed   Cognition Oriented   Psychomotor WNL

## 2022-09-13 PROCEDURE — 25000003 PHARM REV CODE 250: Performed by: PSYCHIATRY & NEUROLOGY

## 2022-09-13 PROCEDURE — 25000003 PHARM REV CODE 250: Performed by: FAMILY MEDICINE

## 2022-09-13 PROCEDURE — 25000003 PHARM REV CODE 250: Performed by: NURSE PRACTITIONER

## 2022-09-13 RX ADMIN — DICLOFENAC SODIUM 4 G: 10 GEL TOPICAL at 09:09

## 2022-09-13 RX ADMIN — SERTRALINE HYDROCHLORIDE 50 MG: 50 TABLET ORAL at 09:09

## 2022-09-13 RX ADMIN — MULTIPLE VITAMINS W/ MINERALS TAB 1 TABLET: TAB at 09:09

## 2022-09-13 RX ADMIN — THIAMINE HCL TAB 100 MG 100 MG: 100 TAB at 09:09

## 2022-09-13 RX ADMIN — FOLIC ACID 1 MG: 1 TABLET ORAL at 09:09

## 2022-09-13 RX ADMIN — ACETAMINOPHEN 650 MG: 325 TABLET ORAL at 09:09

## 2022-09-13 RX ADMIN — LEVOTHYROXINE SODIUM 50 MCG: 50 TABLET ORAL at 06:09

## 2022-09-13 RX ADMIN — HYDROXYZINE HYDROCHLORIDE 50 MG: 50 TABLET, FILM COATED ORAL at 09:09

## 2022-09-13 RX ADMIN — DIVALPROEX SODIUM 250 MG: 250 TABLET, DELAYED RELEASE ORAL at 09:09

## 2022-09-13 RX ADMIN — BUSPIRONE HYDROCHLORIDE 5 MG: 5 TABLET ORAL at 09:09

## 2022-09-13 NOTE — CARE UPDATE
Pt accepted into [facility] for [facility]. Once DC paperwork completed, CM will arrange transportation to take him there.

## 2022-09-13 NOTE — NURSING
Patient was discharged per doctor's order's in a stable condition. Patient denied suicidal ideation, homicidal ideation, auditory and visual hallucinations. Patient was discharged with valuables, personal belongings, prescriptions and / or medications, discharge instructions, and an educational handout explaining the diagnosis and prescribed medications. Patient verbalized understanding of the discharge instructions and the importance of follow - up visits. Patient was escorted out of the facility by a [facility] and placed into a private vehicle in route to the [facility] addiction

## 2022-10-22 NOTE — DISCHARGE SUMMARY
DISCHARGE SUMMARY  PSYCHIATRY      Admit Date: 9/4/2022  8:00 AM    Discharge Date:  10/22/2022    SITE:   [facility] *  [facility]    Discharge Attending Physician: No att. providers found    History of Present Illness On Admit:   Bjorn Handy is a 62 y.o. male placed under a PEC at [facility] on [facility] due to reports that he felt like his depression medication was ineffective and he had been having suicidal ideation.  He reported a potential plan of buying a gun and shooting himself.  [facility] states [facility] [place] [facility] [place] to [place] because he needs eye surgery.  He was most recently in [place] 1 month ago.  Calm and cooperative during my exam.  He does not feel as though his current medication regimen has been sufficient for depression.  Admitted for medication management and safety monitoring.     UDS: presumptive cannabis  Blood alcohol: 214 in ED    Diagnoses:  PRINCIPAL PROBLEM: <principal problem not specified>    PROBLEM LIST    Acute pain of left shoulder      Discharged Condition: Stable    Hospital Course:   Patient was admitted to [facility].  He was initiated on a Gabapentin taper for suspected alcohol withdrawals, which he tolerated well.  His home medications of Zoloft 50mg PO daily was also started to assist with his mood issues.  He later on admitted to being on Depakote as well, which was resumed.  He was compliant with the medications. Irritable at times. Staff was able to secure substance treatment at [facility] through their 's program. At the time of discharge he denied SI and HI as well as AH and VH.    Disposition: discharged to rehab      DISCHARGE EXAMINATION    VITALS   Vitals:    09/08/22 0800 09/11/22 0800 09/12/22 0701 09/12/22 1508   BP: 115/74 (!) 155/83 134/80    Pulse: 84 96 90    Resp: 18 18 18    Temp: 97.9 °F (36.6 °C) 97.9 °F (36.6 °C) 98.1 °F (36.7 °C)     TempSrc:       SpO2: 97% 99% 95%    Weight:    81.1 kg (178 lb 12.8 oz)   Height:             General Appearance: appears stated age, well-developed, well-nourished  Arousal: alert  Behavior: cooperative  Movements and Motor Activity: no abnormal involuntary movements noted  Orientation: oriented to person, place, time, and situation  Speech: normal rate, normal rhythm, normal volume, normal tone  Mood: Depressed, improving  Affect: mood-congruent, constricted  Thought Process: linear  Associations: intact  Thought Content and Perceptions: no suicidal ideation, no homicidal ideation, no auditory hallucinations, no visual hallucinations, no paranoid ideation, no ideas of reference, no evidence of delusions or psychosis  Recent and Remote Memory: recent memory intact, remote memory intact  Attention and Concentration: intact, attentive to conversation  Fund of Knowledge: intact, aware of current events, vocabulary appropriate  Insight: intact  Judgment: questionable      Medication Regimen:  No current facility-administered medications for this encounter.    Current Outpatient Medications:     busPIRone (BUSPAR) 5 MG Tab, Take 1 tablet (5 mg total) by mouth 2 (two) times daily., Disp: 60 tablet, Rfl: 0    divalproex (DEPAKOTE) 250 MG EC tablet, Take 1 tablet (250 mg total) by mouth 3 (three) times daily., Disp: 90 tablet, Rfl: 0    gabapentin (NEURONTIN) 100 MG capsule, Take 1 capsule (100 mg total) by mouth 3 (three) times daily., Disp: 90 capsule, Rfl: 0    sertraline (ZOLOFT) 50 MG tablet, Take 1 tablet (50 mg total) by mouth once daily., Disp: 30 tablet, Rfl: 0    traZODone (DESYREL) 100 MG tablet, Take 2 tablets (200 mg total) by mouth every evening., Disp: 60 tablet, Rfl: 0      Patient Instructions:   Continue medication regimen as prescribed.    Disposition plan per  - see  notes for details.    Patient instructed to call 911 or present to emergency department if any of the  following complications develop status post discharge: suicidality, homicidality, or grave disability.     Total time spent discharging patient: <30 minutes      Rafita Carbajal, [id number]